# Patient Record
Sex: FEMALE | Race: WHITE | Employment: FULL TIME | URBAN - METROPOLITAN AREA
[De-identification: names, ages, dates, MRNs, and addresses within clinical notes are randomized per-mention and may not be internally consistent; named-entity substitution may affect disease eponyms.]

---

## 2022-06-06 ENCOUNTER — OFFICE VISIT (OUTPATIENT)
Dept: OBGYN CLINIC | Age: 27
End: 2022-06-06

## 2022-06-06 VITALS — DIASTOLIC BLOOD PRESSURE: 68 MMHG | SYSTOLIC BLOOD PRESSURE: 120 MMHG | BODY MASS INDEX: 29.62 KG/M2 | WEIGHT: 178 LBS

## 2022-06-06 DIAGNOSIS — N94.10 DYSPAREUNIA, FEMALE: ICD-10-CM

## 2022-06-06 DIAGNOSIS — T19.2XXS RETAINED TAMPON, SEQUELA: Primary | ICD-10-CM

## 2022-06-06 DIAGNOSIS — R45.86 MOOD CHANGE: ICD-10-CM

## 2022-06-06 PROCEDURE — 99214 OFFICE O/P EST MOD 30 MIN: CPT | Performed by: OBSTETRICS & GYNECOLOGY

## 2022-06-06 RX ORDER — LORAZEPAM 1 MG/1
1 TABLET ORAL EVERY 8 HOURS PRN
Qty: 25 TABLET | Refills: 1 | Status: SHIPPED | OUTPATIENT
Start: 2022-06-06 | End: 2022-09-04

## 2022-06-06 NOTE — PROGRESS NOTES
The patient is here for  problems including sp retained  Tampon     HISTORY:      Patient's last menstrual period was 05/23/2022. SEE BELOW      Current Outpatient Medications on File Prior to Visit   Medication Sig Dispense Refill    lisdexamfetamine (VYVANSE) 30 MG capsule Take 30 mg by mouth every morning.  Norgestim-Eth Estrad Triphasic 0.18/0.215/0.25 MG-35 MCG TABS TAKE 1 TABLET BY MOUTH EVERY DAY       No current facility-administered medications on file prior to visit. SEE LIST    ROS:      PHYSICAL EXAM:  Blood pressure 120/68, weight 178 lb (80.7 kg), last menstrual period 05/23/2022, not currently breastfeeding. The patient appears well, alert, oriented x 3, in no distress. Lungs are clear. Heart RRR, no murmurs. Abdomen soft without tenderness, guarding, mass or organomegaly. Pelvic: normal external genitalia, vulva, vagina, cervix, uterus and adnexa. ASSESSMENT:DIAGNOSIS DISCUSSED INCLUDING DIFFERENTIAL sp ER retained tampon 3w ago  Sp abx no cmt no dc  ocp  Dyspareunia vag cones     wants ativan 1mg for precoital     PLAN:    No orders of the defined types were placed in this encounter.     Complex high risk decision making many questions answered history reviewed precharting done required 30 minute of time discussing a vaiety of problems  goals are set  follow up planned

## 2022-06-14 DIAGNOSIS — F90.2 ATTENTION DEFICIT HYPERACTIVITY DISORDER (ADHD), COMBINED TYPE: Primary | ICD-10-CM

## 2022-07-25 ENCOUNTER — OFFICE VISIT (OUTPATIENT)
Dept: FAMILY MEDICINE CLINIC | Facility: CLINIC | Age: 27
End: 2022-07-25

## 2022-07-25 VITALS
HEIGHT: 66 IN | RESPIRATION RATE: 18 BRPM | SYSTOLIC BLOOD PRESSURE: 123 MMHG | TEMPERATURE: 97.8 F | DIASTOLIC BLOOD PRESSURE: 71 MMHG | HEART RATE: 92 BPM | BODY MASS INDEX: 29.49 KG/M2 | WEIGHT: 183.5 LBS | OXYGEN SATURATION: 97 %

## 2022-07-25 DIAGNOSIS — F90.2 ATTENTION DEFICIT HYPERACTIVITY DISORDER (ADHD), COMBINED TYPE: Primary | ICD-10-CM

## 2022-07-25 DIAGNOSIS — E66.09 CLASS 1 OBESITY DUE TO EXCESS CALORIES WITHOUT SERIOUS COMORBIDITY WITH BODY MASS INDEX (BMI) OF 30.0 TO 30.9 IN ADULT: ICD-10-CM

## 2022-07-25 PROCEDURE — 99213 OFFICE O/P EST LOW 20 MIN: CPT | Performed by: FAMILY MEDICINE

## 2022-07-25 NOTE — PROGRESS NOTES
[address] [facility] [address]  Phone: [phone] Fax [phone]  Jim Bustamante. Yo Griffith M.D.  7/25/2022        Julia Zambrano is a 32 y.o. female     HPI:  Patient is seen for Follow-up (3 month)  States that the Vyvanse 30 mg has been helpful with concentration and staying on task. She actually lost her last prescription that she had filled a couple of weeks ago. Has noticed much more difficulty staying focused recently. Does not report any significant depression. She has changed jobs this year and has not been going to the gym as much. ROS:  No chest pain, dyspnea, palpitations, tachycardia, insomnia, decreased appetite, or abdominal pain. Allergies   Allergen Reactions    Ciprofloxacin Other (See Comments)     Numbness and tingly feeling       Active Ambulatory Problems     Diagnosis Date Noted    No Active Ambulatory Problems     Resolved Ambulatory Problems     Diagnosis Date Noted    No Resolved Ambulatory Problems     No Additional Past Medical History        History reviewed. No pertinent surgical history. Social History     Tobacco Use    Smoking status: Every Day    Smokeless tobacco: Never    Tobacco comments:     Quit smoking: jewel   Vaping Use    Vaping Use: Never used   Substance Use Topics    Alcohol use: Yes     Alcohol/week: 4.0 standard drinks    Drug use: No       Physical Exam:  Blood pressure 123/71, pulse 92, temperature 97.8 °F (36.6 °C), temperature source Temporal, resp. rate 18, height 5' 5.5\" (1.664 m), weight 183 lb 8 oz (83.2 kg), last menstrual period 07/14/2022, SpO2 97 %, not currently breastfeeding. Pleasant female in no apparent distress. Affect is appropriate. HEENT grossly normal.  Lungs clear. Cardiovascular regular S1-S2 without murmurs rubs or gallops. Radial pulses 2+. Abdomen is soft, mildly obese, and nontender with positive bowel sounds. No hepatosplenomegaly. 1+ patellar DTRs.   No peripheral edema or cyanosis. Assessment and Plan:   Diagnosis Orders   1. Attention deficit hyperactivity disorder (ADHD), combined type  lisdexamfetamine (VYVANSE) 30 MG capsule    lisdexamfetamine (VYVANSE) 30 MG capsule    lisdexamfetamine (VYVANSE) 30 MG capsule      2. Class 1 obesity due to excess calories without serious comorbidity with body mass index (BMI) of 30.0 to 30.9 in adult          Information on ADHD and on low carbohydrate diet provided. Refilled her Vyvanse for the next 3 months but unfortunately she cannot get her Vyvanse 30 mg filled until Wednesday, July 27. Encouraged 150 minutes of low impact aerobic activity weekly. Also encouraged resistance training every other day with 24-48 hours of muscle glucose uptake subsequently. Reassess here in October 24 or more quickly as needed. Discharge Meds:  Current Outpatient Medications   Medication Sig Dispense Refill    [START ON 7/27/2022] lisdexamfetamine (VYVANSE) 30 MG capsule Take 1 capsule by mouth every morning for 30 days. 30 capsule 0    [START ON 8/26/2022] lisdexamfetamine (VYVANSE) 30 MG capsule Take 1 capsule by mouth every morning for 30 days. 30 capsule 0    [START ON 9/25/2022] lisdexamfetamine (VYVANSE) 30 MG capsule Take 1 capsule by mouth every morning for 30 days. 30 capsule 0    LORazepam (ATIVAN) 1 MG tablet Take 1 tablet by mouth every 8 hours as needed for Anxiety for up to 90 days. Take w food 1hr before anticipated coitus 25 tablet 1    Norgestim-Eth Estrad Triphasic 0.18/0.215/0.25 MG-35 MCG TABS TAKE 1 TABLET BY MOUTH EVERY DAY       No current facility-administered medications for this visit. I have reviewed this patient's report generated by the Prescription Monitoring Program which does not demonstrate aberrancies or inconsistencies with regard to the historical prescribing of controlled medications to this patient by other providers. Return in 13 weeks (on 10/24/2022).         Any new medications were explained today including any potential drug interactions or significant side effects. The patient's questions in regards to this were answered today. Dictated using voice recognition software.   Proofread, but unrecognized errors may exist.

## 2022-10-27 ENCOUNTER — OFFICE VISIT (OUTPATIENT)
Dept: FAMILY MEDICINE CLINIC | Facility: CLINIC | Age: 27
End: 2022-10-27

## 2022-10-27 VITALS
SYSTOLIC BLOOD PRESSURE: 118 MMHG | OXYGEN SATURATION: 95 % | TEMPERATURE: 97.7 F | DIASTOLIC BLOOD PRESSURE: 83 MMHG | HEART RATE: 66 BPM | WEIGHT: 166.2 LBS | HEIGHT: 66 IN | BODY MASS INDEX: 26.71 KG/M2 | RESPIRATION RATE: 18 BRPM

## 2022-10-27 DIAGNOSIS — F90.2 ATTENTION DEFICIT HYPERACTIVITY DISORDER (ADHD), COMBINED TYPE: Primary | ICD-10-CM

## 2022-10-27 PROCEDURE — 99213 OFFICE O/P EST LOW 20 MIN: CPT | Performed by: FAMILY MEDICINE

## 2022-10-27 RX ORDER — DEXTROAMPHETAMINE SACCHARATE, AMPHETAMINE ASPARTATE, DEXTROAMPHETAMINE SULFATE AND AMPHETAMINE SULFATE 2.5; 2.5; 2.5; 2.5 MG/1; MG/1; MG/1; MG/1
10 TABLET ORAL 2 TIMES DAILY
Qty: 60 TABLET | Refills: 0 | Status: SHIPPED | OUTPATIENT
Start: 2022-11-05 | End: 2022-12-05

## 2022-10-27 NOTE — LETTER
CONTROLLED SUBSTANCE MEDICATION AGREEMENT     Patient Name: Franklin Tillman  Patient YOB: 1995   I understand, that controlled substance medications may be used to help better manage my symptoms and to improve my ability to function at home, work and in social settings. However, I also understand that these medications do have risks, which have been discussed with me, including possible development of physical or psychological dependence. I understand that successful treatment requires mutual trust and honesty between me and my provider. I understand and agree that following this Medication Agreement is necessary in continuing my provider-patient relationship and the success of my treatment plan. Explanation from my Provider: Benefits and Goals of Controlled Substance Medications: There are two potential goals for your treatment: (1) decreased pain and suffering (2) improved daily life functions. There are many possible treatments for your chronic condition(s). Alternatives such as physical therapy, yoga, massage, home daily exercise, meditation, relaxation techniques, injections, chiropractic manipulations, surgery, cognitive therapy, hypnosis and many medications that are not habit-forming may be used. Use of controlled substance medications may be helpful, but they are unlikely to resolve all symptoms or restore all function. Explanation from my Provider: Risks of Controlled Substance Medications:  Opioid pain medications: These medications can lead to problems such as addiction/dependence, sedation, lightheadedness/dizziness, memory issues, falls, constipation, nausea, or vomiting. They may also impair the ability to drive or operate machinery. Additionally, these medications may lower testosterone levels, leading to loss of bone strength, stamina and sex drive.   They may cause problems with breathing, sleep apnea and reduced coughing, which is especially dangerous for patients with medications are combined with other stimulants, such as caffeine pills or energy drinks, certain weight loss supplements and oral decongestants. Dependence withdrawal symptoms may include depressed mood, loss of interest, suicidal thoughts, anxiety, fatigue, appetite changes and agitation. Testosterone replacement therapy:  Potential side effects include increased risk of stroke and heart attack, blood clots, increased blood pressure, increased cholesterol, enlarged prostate, sleep apnea, irritability/aggression and other mood disorders, and decreased fertility. I agree and understand that I and my prescriber have the following rights and responsibilities regarding my treatment plan:     1. MY RIGHTS:  To be informed of my treatment and medication plan. To be an active participant in my health and wellbeing. 2. MY RESPONSIBILITY AND UNDERSTANDING FOR USE OF MEDICATIONS   I will take medications at the dose and frequency as directed. For my safety, I will not increase or change how I take my medications without the recommendation of my healthcare provider.  I will actively participate in any program recommended by my provider which may improve function, including social, physical, psychological programs.  I will not take my medications with alcohol or other drugs not prescribed to me. I understand that drinking alcohol with my medications increases the chances of side effects, including reduced breathing rate and could lead to personal injury when operating machinery.  I understand that if I have a history of substance use disorders, including alcohol or other illicit drugs, that I may be at increased risk of addiction to my medications.  I agree to notify my provider immediately if I should become pregnant so that my treatment plan can be adjusted.    I agree and understand that I shall only receive controlled substance medications from the prescriber that signed this agreement unless there is [email or web address]    5. MY RESPONSIBILITY WITH ILLEGAL DRUGS    I will not use illegal or street drugs or another person's prescription medications not prescribed to me.  If there are identified addiction type symptoms, then referral to a program may be provided by my provider and I agree to follow through with this recommendation. 6. MY RESPONSIBILITY FOR COOPERATION WITH INVESTIGATIONS   I understand that my provider will comply with any applicable law and may discuss my use and/or possible misuse/abuse of controlled substances and alcohol, as appropriate, with any health care provider involved in my care, pharmacist, or legal authority.  I authorize my provider and pharmacy to cooperate fully with law enforcement agencies (as permitted by law) in the investigation of any possible misuse, sale, or other diversion of my controlled substances.  I agree to waive any applicable privilege or right of privacy or confidentiality with respect to these authorizations. 7. PROVIDERS RIGHT TO MONITOR FOR SAFETY: PRESCRIPTION MONITORING / DRUG TESTING   I consent to drug/toxicology screening and will submit to a drug screen upon my providers request to assure I am only taking the prescribed drugs for my safety monitoring. I understand that a drug screen is a laboratory test in which a sample of my urine, blood or saliva is checked to see what drugs I have been taking. This may entail an observed urine specimen, which means that a nurse or other health care provider may watch me provide urine, and I will cooperate if I am asked to provide an observed specimen.  I understand that my provider will check a copy of my State Prescription Monitoring Program () Report in order to safely prescribe medications.  Pill Counts: I consent to pill counts when requested.   I may be asked to bring all my prescribed controlled substance medications, in their original bottles, to all of my scheduled appointments. In addition, my provider may ask me to come to the practice at any time for a random pill count. 8. TERMINATION OF THIS AGREEMENT  For my safety, my prescriber has the right to stop prescribing controlled substance medications and may end this agreement. Initials:_______   Conditions that may result in termination of this agreement:  a. I do not show any improvement in pain, or my activity has not improved. b. I develop rapid tolerance or loss of improvement, as described in my treatment plan.  c. I develop significant side effects from the medication. d. My behavior is not consistent with the responsibilities outlined above, thereby causing safety concerns to continue prescribing controlled substance medications. e. I fail to follow the terms of this agreement. f. Other:____________________________       UNDERSTANDING THIS MEDICATION AGREEMENT:    I have read the above and have had all my questions answered. For chronic disease management, I know that my symptoms can be managed with many types of treatments. A chronic medication trial may be part of my treatment, but I must be an active participant in my care. Medication therapy is only one part of my symptom management plan. In some cases, there may be limited scientific evidence to support the chronic use of certain medications to improve symptoms and daily function. Furthermore, in certain circumstances, there may be scientific information that suggests that the use of chronic controlled substances may worsen my symptoms and increase my risk of unintentional death directly related to this medication therapy. I know that if my provider feels my risk from controlled medications is greater than my benefit, I will have my controlled substance medication(s) compassionately lowered or removed altogether.      I further agree to allow this office to contact my HIPAA contact if there are concerns about my safety and use of the controlled medications. I have agreed to use the prescribed controlled substance medications to me as instructed by my provider and as stated in this Medication Agreement. My initial on each page and my signature below shows that I have read each page and I have had the opportunity to ask questions with answers provided by my provider.     Patient Name (Printed): _____________________________________  Patient Signature:  ______________________   Date: _____________    Prescriber Name (Printed): ___________________________________  Prescriber Signature: _____________________  Date: _____________

## 2022-10-27 NOTE — PROGRESS NOTES
[address] [facility] [address]  Phone: [phone] Fax [phone]  Efrain Baker M.D.  10/27/2022        Latoya Swift is a 32 y.o. female     HPI:  Patient is seen for Follow-up (13 weeks, also having a lot of headaches lately )  States that the Vyvanse 30 mg has worked fairly well. However she reports paying out-of-pocket for this and it being greater than $200 monthly. Would like to try something less expensive. ROS:  No chest pain, dyspnea, palpitations, tachycardia, insomnia, decreased appetite, or abdominal pain. Allergies   Allergen Reactions    Ciprofloxacin Other (See Comments)     Numbness and tingly feeling       Active Ambulatory Problems     Diagnosis Date Noted    No Active Ambulatory Problems     Resolved Ambulatory Problems     Diagnosis Date Noted    No Resolved Ambulatory Problems     No Additional Past Medical History        History reviewed. No pertinent surgical history. Social History     Tobacco Use    Smoking status: Every Day    Smokeless tobacco: Never    Tobacco comments:     Quit smoking: jewel   Substance Use Topics    Alcohol use: Yes     Alcohol/week: 4.0 standard drinks    Drug use: No       Physical Exam:  Blood pressure 118/83, pulse 66, temperature 97.7 °F (36.5 °C), temperature source Temporal, resp. rate 18, height 5' 5.5\" (1.664 m), weight 166 lb 3.2 oz (75.4 kg), last menstrual period 09/05/2022, SpO2 95 %, not currently breastfeeding. Pleasant female in no apparent distress. Affect is mildly anxious. HEENT grossly normal.  Lungs clear. Cardiovascular regular S1-S2 without murmurs rubs or gallops. Radial pulses 2+. Abdomen is soft and nontender. Neuro grossly intact. No peripheral edema or cyanosis. Assessment and Plan:   Diagnosis Orders   1.  Attention deficit hyperactivity disorder (ADHD), combined type  amphetamine-dextroamphetamine (ADDERALL, 10MG,) 10 MG tablet        Information on ADHD provided. Also information on smoking/vaping cessation provided. Change from Vyvanse 30 mg to Adderall 10 mg twice daily with follow-up virtual visit here on December 2. I do see at this time where she reported to my medical assistant that she has been having more frequent headaches. Need to address this at follow-up. Substance abuse policy signed today. Discharge Meds:  Current Outpatient Medications   Medication Sig Dispense Refill    [START ON 11/5/2022] amphetamine-dextroamphetamine (ADDERALL, 10MG,) 10 MG tablet Take 1 tablet by mouth 2 times daily for 30 days. 60 tablet 0    lisdexamfetamine (VYVANSE) 30 MG capsule Take 1 capsule by mouth every morning for 30 days. 30 capsule 0    lisdexamfetamine (VYVANSE) 30 MG capsule Take 1 capsule by mouth every morning for 30 days. 30 capsule 0    lisdexamfetamine (VYVANSE) 30 MG capsule Take 1 capsule by mouth every morning for 30 days. 30 capsule 0    Norgestim-Eth Estrad Triphasic 0.18/0.215/0.25 MG-35 MCG TABS TAKE 1 TABLET BY MOUTH EVERY DAY (Patient not taking: Reported on 10/27/2022)       No current facility-administered medications for this visit. I have reviewed this patient's report generated by the Prescription Monitoring Program which does not demonstrate aberrancies or inconsistencies with regard to the historical prescribing of controlled medications to this patient by other providers. Return in 5 weeks (on 12/2/2022) for follow up at 12:30. Any new medications were explained today including any potential drug interactions or significant side effects. The patient's questions in regards to this were answered today. Dictated using voice recognition software.   Proofread, but unrecognized errors may exist.

## 2022-11-29 ENCOUNTER — OFFICE VISIT (OUTPATIENT)
Dept: OBGYN CLINIC | Age: 27
End: 2022-11-29

## 2022-11-29 VITALS — BODY MASS INDEX: 26.84 KG/M2 | WEIGHT: 167 LBS | HEIGHT: 66 IN

## 2022-11-29 DIAGNOSIS — Z01.419 WELL WOMAN EXAM WITH ROUTINE GYNECOLOGICAL EXAM: Primary | ICD-10-CM

## 2022-11-29 DIAGNOSIS — Z12.4 SCREENING FOR CERVICAL CANCER: ICD-10-CM

## 2022-11-29 PROCEDURE — 99395 PREV VISIT EST AGE 18-39: CPT | Performed by: OBSTETRICS & GYNECOLOGY

## 2022-11-29 NOTE — PROGRESS NOTES
Alison Edouard  is a 32 y.o. Digna Aloe  who is here for an annual exam.      History  History reviewed. No pertinent past medical history. ON FILE  No past surgical history on file. PRESENT IN FILE  Current Outpatient Medications on File Prior to Visit   Medication Sig Dispense Refill    amphetamine-dextroamphetamine (ADDERALL, 10MG,) 10 MG tablet Take 1 tablet by mouth 2 times daily for 30 days. 60 tablet 0    lisdexamfetamine (VYVANSE) 30 MG capsule Take 1 capsule by mouth every morning for 30 days. 30 capsule 0     No current facility-administered medications on file prior to visit. SEE UPDATED LIST  Allergies   Allergen Reactions    Ciprofloxacin Other (See Comments)     Numbness and tingly feeling   SEE LIST  Social History     Tobacco Use    Smoking status: Every Day    Smokeless tobacco: Never    Tobacco comments:     Quit smoking: jewel   Substance Use Topics    Alcohol use: Yes     Alcohol/week: 4.0 standard drinks      Family History   Problem Relation Age of Onset    Ovarian Cancer Neg Hx     Colon Cancer Neg Hx     No Known Problems Sister     Hypertension Mother     High Cholesterol Father     Breast Cancer Paternal Aunt      OBGYN History:             Physical Exam  Height 5' 5.5\" (1.664 m), weight 167 lb (75.8 kg), last menstrual period 11/19/2022, not currently breastfeeding. Body mass index is 27.37 kg/m². Lab Results   Component Value Date/Time    HGB 14.1 02/22/2022 11:26 AM      @LASTPROCAMB([id number])@  No results found for: HCGUQC, PREGU, HCGQR, THCGA1    HEENT unremarkable. Sclera non-icteric. Neck is supple without thyromegaly or nodes. Chest clear to auscultation. Heart regular rate and rhythm with no murmur, Breast exam reveals no masses or nipple discharge. No axillary notes are palpable. Abdomen is benign. BUS is normal.  Cervix IF  present. Pap smeaR performed. PRN  Bimanual exam reveals no masses.     Assessment  32 y.o. Digna Sykes  for annual exam.  Encounter Diagnoses Name Primary? Well woman exam with routine gynecological exam Yes    Screening for cervical cancer        Plan  Orders Placed This Encounter   Procedures    PAP LB, Reflex HPV ASCUS     Standing Status:   Future     Standing Expiration Date:   11/29/2023     Order Specific Question:   Pap Source? (Required)     Answer:   cervical     Order Specific Question:   Pap Source? (Required)     Answer:   endocervical     Order Specific Question:   Pap collection method? (Required     Answer:   brush     Order Specific Question:   Pap collection method? (Required     Answer:   spatula     Order Specific Question:   Menstrual Status ? Answer:   Having periods [5]     Order Specific Question:   Date of LMP? (Required)     Answer:   11/19/2022     Order Specific Question:   Previous Treatment? (Required)     Answer:   NONE   Evra  decl std ch -ucg offered hemophillia bloodwork   \"DEXA ordered\",AT AGE 63          \"Screening olonoscopy ordered\",IF >44YO  DUE         \"Recommend Calcium/MVI\",IF >35YRS  \"Recommend Gardisil immunization\"IF AGE 9-45     }CONTRACEPTION DISCUSSED      STD CHECK OFFERED IF <30YO  ,MAMMOGRAM SCHEDULED IF >41YO          MOOD DISCUSSED             NOT SUICIDAL  HEALTH MEASURE DISCUSSED INCLUDING TEETH/EYE [facility]  APPTS                    DIET/EXERCISE /SLEEP    DISCUSSED                SMOKING DISCUSSED PRN      BLADDER CONTROL Calli Ramos  is a 32 y.o. Crescencio Martini  who is here for an annual exam.      History  History reviewed. No pertinent past medical history. ON FILE  No past surgical history on file. PRESENT IN FILE  Current Outpatient Medications on File Prior to Visit   Medication Sig Dispense Refill    amphetamine-dextroamphetamine (ADDERALL, 10MG,) 10 MG tablet Take 1 tablet by mouth 2 times daily for 30 days. 60 tablet 0    lisdexamfetamine (VYVANSE) 30 MG capsule Take 1 capsule by mouth every morning for 30 days.  30 capsule 0     No current facility-administered medications on file prior to visit. SEE UPDATED LIST  Allergies   Allergen Reactions    Ciprofloxacin Other (See Comments)     Numbness and tingly feeling   SEE LIST  Social History     Tobacco Use    Smoking status: Every Day    Smokeless tobacco: Never    Tobacco comments:     Quit smoking: jewel   Substance Use Topics    Alcohol use: Yes     Alcohol/week: 4.0 standard drinks      Family History   Problem Relation Age of Onset    Ovarian Cancer Neg Hx     Colon Cancer Neg Hx     No Known Problems Sister     Hypertension Mother     High Cholesterol Father     Breast Cancer Paternal Aunt      OBGYN History:             Physical Exam  Height 5' 5.5\" (1.664 m), weight 167 lb (75.8 kg), last menstrual period 11/19/2022, not currently breastfeeding. Body mass index is 27.37 kg/m². Lab Results   Component Value Date/Time    HGB 14.1 02/22/2022 11:26 AM      @LASTPROCAMB([id number])@  No results found for: HCGUQC, PREGU, HCGQR, THCGA1    HEENT unremarkable. Sclera non-icteric. Neck is supple without thyromegaly or nodes. Chest clear to auscultation. Heart regular rate and rhythm with no murmur, Breast exam reveals no masses or nipple discharge. No axillary notes are palpable. Abdomen is benign. BUS is normal.  Cervix IF  present. Pap smeaR performed. PRN  Bimanual exam reveals no masses. Assessment  32 [facility]. [facility]  for [facility].  Encounter Diagnoses   Name Primary? Well woman exam with routine gynecological exam Yes    Screening for cervical cancer        Plan  Orders Placed This Encounter   Procedures    PAP LB, Reflex HPV ASCUS     Standing Status:   Future     Standing Expiration Date:   11/29/2023     Order Specific Question:   Pap Source? (Required)     Answer:   cervical     Order Specific Question:   Pap Source? (Required)     Answer:   endocervical     Order Specific Question:   Pap collection method?           (Required Answer:   brush     Order Specific Question:   Pap collection method? (Required     Answer:   spatula     Order Specific Question:   Menstrual Status ? Answer:   Having periods [5]     Order Specific Question:   Date of LMP? (Required)     Answer:   11/19/2022     Order Specific Question:   Previous Treatment?           (Required)     Answer:   NONE     \"DEXA ordered\",AT AGE 61          \"Screening olonoscopy ordered\",IF >46YO  DUE         \"Recommend Calcium/MVI\",IF >35YRS  \"Recommend Gardisil immunization\"IF AGE 9-45     }CONTRACEPTION DISCUSSED      STD CHECK OFFERED IF <30YO  ,MAMMOGRAM SCHEDULED IF >41YO          MOOD DISCUSSED             NOT SUICIDAL  HEALTH MEASURE DISCUSSED INCLUDING TEETH/EYE [employer]  APPTS                    DIET/EXERCISE /SLEEP    DISCUSSED                SMOKING DISCUSSED PRN      BLADDER CONTROL DISCUSSED

## 2022-12-01 DIAGNOSIS — F90.2 ATTENTION DEFICIT HYPERACTIVITY DISORDER (ADHD), COMBINED TYPE: ICD-10-CM

## 2022-12-01 NOTE — TELEPHONE ENCOUNTER
Pt appt for 12/2/22 was rescheduled due to Provider out. Needing refills on her Adderall. Last OV 10/27/22.  Next OV 1/31/23

## 2022-12-02 LAB
CYTOLOGIST CVX/VAG CYTO: NORMAL
CYTOLOGY CVX/VAG DOC THIN PREP: NORMAL
HPV REFLEX: NORMAL
Lab: NORMAL
PATH REPORT.FINAL DX SPEC: NORMAL
STAT OF ADQ CVX/VAG CYTO-IMP: NORMAL

## 2022-12-02 RX ORDER — DEXTROAMPHETAMINE SACCHARATE, AMPHETAMINE ASPARTATE, DEXTROAMPHETAMINE SULFATE AND AMPHETAMINE SULFATE 2.5; 2.5; 2.5; 2.5 MG/1; MG/1; MG/1; MG/1
10 TABLET ORAL 2 TIMES DAILY
Qty: 60 TABLET | Refills: 0 | Status: SHIPPED | OUTPATIENT
Start: 2022-12-04 | End: 2023-01-03

## 2022-12-03 NOTE — TELEPHONE ENCOUNTER
Sent in prescription for:     Requested Prescriptions     Signed Prescriptions Disp Refills    amphetamine-dextroamphetamine (ADDERALL, 10MG,) 10 MG tablet 60 tablet 0     Sig: Take 1 tablet by mouth 2 times daily for 30 days.      Authorizing Provider: Mat Guadalupe

## 2023-01-03 DIAGNOSIS — F90.2 ATTENTION DEFICIT HYPERACTIVITY DISORDER (ADHD), COMBINED TYPE: ICD-10-CM

## 2023-01-03 RX ORDER — DEXTROAMPHETAMINE SACCHARATE, AMPHETAMINE ASPARTATE, DEXTROAMPHETAMINE SULFATE AND AMPHETAMINE SULFATE 2.5; 2.5; 2.5; 2.5 MG/1; MG/1; MG/1; MG/1
10 TABLET ORAL 2 TIMES DAILY
Qty: 60 TABLET | Refills: 0 | Status: SHIPPED | OUTPATIENT
Start: 2023-01-05 | End: 2023-02-04

## 2023-01-03 NOTE — TELEPHONE ENCOUNTER
Medication Refill Request      Name of Medication : Adderall        Strength of Medication: 10 mg       Directions: 2 times daily       30 day or 90 day supply: 30 day supply      [facility]    Additional Information For Provider:

## 2023-01-31 ENCOUNTER — TELEMEDICINE (OUTPATIENT)
Dept: FAMILY MEDICINE CLINIC | Facility: CLINIC | Age: 28
End: 2023-01-31

## 2023-01-31 DIAGNOSIS — F90.2 ATTENTION DEFICIT HYPERACTIVITY DISORDER (ADHD), COMBINED TYPE: Primary | ICD-10-CM

## 2023-01-31 PROCEDURE — 99213 OFFICE O/P EST LOW 20 MIN: CPT | Performed by: FAMILY MEDICINE

## 2023-01-31 RX ORDER — DEXTROAMPHETAMINE SACCHARATE, AMPHETAMINE ASPARTATE, DEXTROAMPHETAMINE SULFATE AND AMPHETAMINE SULFATE 2.5; 2.5; 2.5; 2.5 MG/1; MG/1; MG/1; MG/1
10 TABLET ORAL 2 TIMES DAILY
Qty: 60 TABLET | Refills: 0 | Status: SHIPPED | OUTPATIENT
Start: 2023-03-10 | End: 2023-04-09

## 2023-01-31 RX ORDER — DEXTROAMPHETAMINE SACCHARATE, AMPHETAMINE ASPARTATE, DEXTROAMPHETAMINE SULFATE AND AMPHETAMINE SULFATE 2.5; 2.5; 2.5; 2.5 MG/1; MG/1; MG/1; MG/1
10 TABLET ORAL 2 TIMES DAILY
Qty: 60 TABLET | Refills: 0 | Status: SHIPPED | OUTPATIENT
Start: 2023-02-08 | End: 2023-03-10

## 2023-01-31 RX ORDER — DEXTROAMPHETAMINE SACCHARATE, AMPHETAMINE ASPARTATE, DEXTROAMPHETAMINE SULFATE AND AMPHETAMINE SULFATE 2.5; 2.5; 2.5; 2.5 MG/1; MG/1; MG/1; MG/1
10 TABLET ORAL 2 TIMES DAILY
Qty: 60 TABLET | Refills: 0 | Status: SHIPPED | OUTPATIENT
Start: 2023-04-09 | End: 2023-05-09

## 2023-01-31 NOTE — PROGRESS NOTES
[address] [facility] [address]  Phone: [phone] Fax [phone]  Paul Gilmore M.D.  1/31/2023        Pebbles Agosto is a 32 y.o. female     I was in the office while conducting this encounter    HPI:  Patient is seen for Follow-up (5 weeks)  Patient changed her Vyvanse to Adderall 10 mg twice daily secondary to expense. Difference monthly was $300 versus $30. She has noticed that if she exercises briskly such as running after taking the medication, her heart rate will sometimes reach 200. This is higher than what she had experienced prior. States her current resting heart rate is in the 80s mostly. Has started exercising prior to taking the medication in the morning and this has seemed to be helpful. Medication continues to help with concentration and staying on task as she is working and going to school. Has not noticed any side effects other than above. ROS:  No chest pain, dyspnea, palpitations, insomnia, decreased appetite, or abdominal pain. Allergies   Allergen Reactions    Ciprofloxacin Other (See Comments)     Numbness and tingly feeling       Active Ambulatory Problems     Diagnosis Date Noted    No Active Ambulatory Problems     Resolved Ambulatory Problems     Diagnosis Date Noted    No Resolved Ambulatory Problems     No Additional Past Medical History       Social History     Tobacco Use    Smoking status: Every Day    Smokeless tobacco: Never    Tobacco comments:     Quit smoking: jewel   Vaping Use    Vaping Use: Never used   Substance Use Topics    Alcohol use: Yes     Alcohol/week: 4.0 standard drinks    Drug use: No       Physical Exam:  not currently breastfeeding. - taken by patient/caregiver at home as was practical.     Pleasant female in no apparent distress. Appears well-developed and well-nourished. Affect is mildly anxious. Able to follow instruction.   HEENT grossly appears normal without any noted facial asymmetry. EOMI. Sclera appear normal.  Oral mucosa appears moist and intact. Respiratory effort appears normal without visualized signs of difficulty breathing/respiratory distress. Assessment and Plan:     Diagnosis Orders   1. Attention deficit hyperactivity disorder (ADHD), combined type  amphetamine-dextroamphetamine (ADDERALL, 10MG,) 10 MG tablet    amphetamine-dextroamphetamine (ADDERALL, 10MG,) 10 MG tablet    amphetamine-dextroamphetamine (ADDERALL, 10MG,) 10 MG tablet        Information on ADHD and stimulants provided. Refilled her Adderall 10 mg twice daily for the next 3 months. If her resting heart rate increases significantly prior to follow-up, she should let us know. Also if her exercise heart rate remains significantly elevated compared to previous despite exercising prior to taking her medication in the morning, she should let us know prior to follow-up. Plan reassessment here May 5 fasting or more quickly as needed. Discharge Meds:  Current Outpatient Medications   Medication Sig Dispense Refill    [START ON 2/8/2023] amphetamine-dextroamphetamine (ADDERALL, 10MG,) 10 MG tablet Take 1 tablet by mouth 2 times daily for 30 days. Max Daily Amount: 20 mg 60 tablet 0    [START ON 3/10/2023] amphetamine-dextroamphetamine (ADDERALL, 10MG,) 10 MG tablet Take 1 tablet by mouth 2 times daily for 30 days. Max Daily Amount: 20 mg 60 tablet 0    [START ON 4/9/2023] amphetamine-dextroamphetamine (ADDERALL, 10MG,) 10 MG tablet Take 1 tablet by mouth 2 times daily for 30 days. Max Daily Amount: 20 mg 60 tablet 0    norelgestromin-ethinyl estradiol Linard Lowing) 150-35 MCG/24HR Place 1 patch onto the skin once a week Use backup protection on new start 3 patch 3     No current facility-administered medications for this visit.      I have reviewed this patient's report generated by the Prescription Monitoring Program which does not demonstrate aberrancies or inconsistencies with regard to the historical prescribing of controlled medications to this patient by other providers.      No follow-up provider specified.      Any new medications were explained today including any potential drug interactions or significant side effects.  The patient's questions in regards to this were answered today.    Dictated using voice recognition software.  Proofread, but unrecognized errors may exist.      Consent:  This patient and/or their healthcare decision maker is aware that this patient-initiated Telehealth encounter is a billable service, with coverage as determined by their insurance carrier. Patient is aware that they may receive a bill and has provided verbal consent to proceed: Yes      This virtual visit was conducted: via [employer].  Pursuant to the emergency declaration under the Holliday Act and the National Emergencies Act, 1135 waiver authority and the Coronavirus Preparedness and Response Supplemental Appropriations Act, this Virtual  Visit was conducted to reduce the patient's risk of exposure to COVID-19 and provide continuity of care for an established patient.  Services were provided through a video synchronous discussion virtually to substitute for in-person clinic visit.  Due to this being a TeleHealth evaluation, many elements of the physical examination are unable to be assessed.       Coding Help - Use CPT Codes 88682-60396, [id number] for Established and New Patients respectively, either employing EM elements or Time rules. Other codes (example consult codes) may also apply.    Time-based coding - will disappear if no selections made : I spent at least 10 minutes with this established patient, and >50% of the time was spent counseling and/or coordinating care regarding ADHD    Yuval Carney MD  01/31/23

## 2023-02-13 ENCOUNTER — TELEPHONE (OUTPATIENT)
Dept: FAMILY MEDICINE CLINIC | Facility: CLINIC | Age: 28
End: 2023-02-13

## 2023-02-13 DIAGNOSIS — F90.2 ATTENTION DEFICIT HYPERACTIVITY DISORDER (ADHD), COMBINED TYPE: ICD-10-CM

## 2023-02-13 RX ORDER — DEXTROAMPHETAMINE SACCHARATE, AMPHETAMINE ASPARTATE, DEXTROAMPHETAMINE SULFATE AND AMPHETAMINE SULFATE 5; 5; 5; 5 MG/1; MG/1; MG/1; MG/1
10 TABLET ORAL 2 TIMES DAILY
Qty: 30 TABLET | Refills: 0 | Status: SHIPPED | OUTPATIENT
Start: 2023-02-13 | End: 2023-03-15

## 2023-02-13 RX ORDER — DEXTROAMPHETAMINE SACCHARATE, AMPHETAMINE ASPARTATE, DEXTROAMPHETAMINE SULFATE AND AMPHETAMINE SULFATE 5; 5; 5; 5 MG/1; MG/1; MG/1; MG/1
10 TABLET ORAL 2 TIMES DAILY
Qty: 30 TABLET | Refills: 0 | Status: SHIPPED | OUTPATIENT
Start: 2023-03-15 | End: 2023-04-14

## 2023-02-13 RX ORDER — DEXTROAMPHETAMINE SACCHARATE, AMPHETAMINE ASPARTATE, DEXTROAMPHETAMINE SULFATE AND AMPHETAMINE SULFATE 5; 5; 5; 5 MG/1; MG/1; MG/1; MG/1
10 TABLET ORAL 2 TIMES DAILY
Qty: 30 TABLET | Refills: 0 | Status: SHIPPED | OUTPATIENT
Start: 2023-04-14 | End: 2023-05-14

## 2023-02-13 NOTE — TELEPHONE ENCOUNTER
[facility]. States that [facility] [place] has the 20 mg. Asking if we can send this in and she can cut in half.

## 2023-05-17 ENCOUNTER — TELEPHONE (OUTPATIENT)
Dept: FAMILY MEDICINE CLINIC | Facility: CLINIC | Age: 28
End: 2023-05-17

## 2023-05-23 ENCOUNTER — OFFICE VISIT (OUTPATIENT)
Dept: FAMILY MEDICINE CLINIC | Facility: CLINIC | Age: 28
End: 2023-05-23

## 2023-05-23 VITALS
TEMPERATURE: 98.1 F | WEIGHT: 165.7 LBS | DIASTOLIC BLOOD PRESSURE: 67 MMHG | RESPIRATION RATE: 18 BRPM | HEIGHT: 66 IN | HEART RATE: 72 BPM | SYSTOLIC BLOOD PRESSURE: 120 MMHG | OXYGEN SATURATION: 99 % | BODY MASS INDEX: 26.63 KG/M2

## 2023-05-23 DIAGNOSIS — F90.2 ATTENTION DEFICIT HYPERACTIVITY DISORDER (ADHD), COMBINED TYPE: Primary | ICD-10-CM

## 2023-05-23 DIAGNOSIS — T14.8XXA BRUISING: ICD-10-CM

## 2023-05-23 DIAGNOSIS — E78.1 HIGH BLOOD TRIGLYCERIDES: ICD-10-CM

## 2023-05-23 DIAGNOSIS — F17.290 OTHER TOBACCO PRODUCT NICOTINE DEPENDENCE, UNCOMPLICATED: ICD-10-CM

## 2023-05-23 LAB
BASOPHILS # BLD: 0.1 K/UL (ref 0–0.2)
BASOPHILS NFR BLD: 1 % (ref 0–2)
DIFFERENTIAL METHOD BLD: NORMAL
EOSINOPHIL # BLD: 0.2 K/UL (ref 0–0.8)
EOSINOPHIL NFR BLD: 3 % (ref 0.5–7.8)
ERYTHROCYTE [DISTWIDTH] IN BLOOD BY AUTOMATED COUNT: 12.8 % (ref 11.9–14.6)
HCT VFR BLD AUTO: 40.7 % (ref 35.8–46.3)
HGB BLD-MCNC: 13.3 G/DL (ref 11.7–15.4)
IMM GRANULOCYTES # BLD AUTO: 0 K/UL (ref 0–0.5)
IMM GRANULOCYTES NFR BLD AUTO: 0 % (ref 0–5)
LYMPHOCYTES # BLD: 2.3 K/UL (ref 0.5–4.6)
LYMPHOCYTES NFR BLD: 32 % (ref 13–44)
MCH RBC QN AUTO: 31.1 PG (ref 26.1–32.9)
MCHC RBC AUTO-ENTMCNC: 32.7 G/DL (ref 31.4–35)
MCV RBC AUTO: 95.3 FL (ref 82–102)
MONOCYTES # BLD: 0.7 K/UL (ref 0.1–1.3)
MONOCYTES NFR BLD: 10 % (ref 4–12)
NEUTS SEG # BLD: 4 K/UL (ref 1.7–8.2)
NEUTS SEG NFR BLD: 54 % (ref 43–78)
NRBC # BLD: 0 K/UL (ref 0–0.2)
PLATELET # BLD AUTO: 315 K/UL (ref 150–450)
PMV BLD AUTO: 10.5 FL (ref 9.4–12.3)
RBC # BLD AUTO: 4.27 M/UL (ref 4.05–5.2)
WBC # BLD AUTO: 7.3 K/UL (ref 4.3–11.1)

## 2023-05-23 PROCEDURE — 99214 OFFICE O/P EST MOD 30 MIN: CPT | Performed by: FAMILY MEDICINE

## 2023-05-23 RX ORDER — DEXTROAMPHETAMINE SACCHARATE, AMPHETAMINE ASPARTATE, DEXTROAMPHETAMINE SULFATE AND AMPHETAMINE SULFATE 5; 5; 5; 5 MG/1; MG/1; MG/1; MG/1
10 TABLET ORAL 2 TIMES DAILY
Qty: 30 TABLET | Refills: 0 | Status: SHIPPED | OUTPATIENT
Start: 2023-07-16 | End: 2023-08-15

## 2023-05-23 RX ORDER — DEXTROAMPHETAMINE SACCHARATE, AMPHETAMINE ASPARTATE, DEXTROAMPHETAMINE SULFATE AND AMPHETAMINE SULFATE 5; 5; 5; 5 MG/1; MG/1; MG/1; MG/1
10 TABLET ORAL 2 TIMES DAILY
Qty: 30 TABLET | Refills: 0 | Status: SHIPPED | OUTPATIENT
Start: 2023-06-16 | End: 2023-07-16

## 2023-05-23 RX ORDER — DEXTROAMPHETAMINE SACCHARATE, AMPHETAMINE ASPARTATE, DEXTROAMPHETAMINE SULFATE AND AMPHETAMINE SULFATE 5; 5; 5; 5 MG/1; MG/1; MG/1; MG/1
10 TABLET ORAL 2 TIMES DAILY
Qty: 30 TABLET | Refills: 0 | Status: SHIPPED | OUTPATIENT
Start: 2023-08-15 | End: 2023-09-14

## 2023-05-23 ASSESSMENT — PATIENT HEALTH QUESTIONNAIRE - PHQ9
SUM OF ALL RESPONSES TO PHQ QUESTIONS 1-9: 0
SUM OF ALL RESPONSES TO PHQ9 QUESTIONS 1 & 2: 0
1. LITTLE INTEREST OR PLEASURE IN DOING THINGS: 0
SUM OF ALL RESPONSES TO PHQ QUESTIONS 1-9: 0
2. FEELING DOWN, DEPRESSED OR HOPELESS: 0
SUM OF ALL RESPONSES TO PHQ QUESTIONS 1-9: 0
SUM OF ALL RESPONSES TO PHQ QUESTIONS 1-9: 0

## 2023-05-23 NOTE — PROGRESS NOTES
[address] [facility] [address]  Phone: [phone] Fax [phone]  Evelia Syed M.D.  5/23/2023        Cynthia Gutierrez is a 29 y.o. female     HPI:  Patient is seen for Follow-up (Med refill )  Patient comes in today fasting for follow-up. The Adderall continues to help with focus. Previously she had some notation for elevated heart rate with exercise but has not noticed this regularly in the recent past.    Has had some more frequent bruising. Most recently she had some right forearm bruising and inflammation that seems to be resolving but she is uncertain as to the reason for this. Does continue to vape daily. States she spoke to her dentist recently who recommended vaping cessation as she was starting to have some dental concerns. Chart review does show where patient previously had some elevated triglycerides. ROS:  No chest pain, dyspnea, palpitations, tachycardia, insomnia, decreased appetite, or abdominal pain. Allergies   Allergen Reactions    Ciprofloxacin Other (See Comments)     Numbness and tingly feeling       Active Ambulatory Problems     Diagnosis Date Noted    Body mass index (BMI) 27.0-27.9, adult 05/23/2023    Attention deficit hyperactivity disorder (ADHD), combined type 05/23/2023    High blood triglycerides 05/23/2023     Resolved Ambulatory Problems     Diagnosis Date Noted    No Resolved Ambulatory Problems     No Additional Past Medical History        History reviewed. No pertinent surgical history. Social History     Tobacco Use    Smoking status: Former     Types: Cigarettes    Smokeless tobacco: Never    Tobacco comments:     Quit smoking: jewel   Vaping Use    Vaping Use: Every day    Substances: Nicotine, Flavoring    Devices: Pre-filled pod   Substance Use Topics    Alcohol use:  Yes     Alcohol/week: 4.0 standard drinks    Drug use: No       Physical Exam:  Blood pressure 120/67, pulse 72, temperature

## 2023-05-24 LAB
ALBUMIN SERPL-MCNC: 4 G/DL (ref 3.5–5)
ALBUMIN/GLOB SERPL: 1 (ref 0.4–1.6)
ALP SERPL-CCNC: 75 U/L (ref 50–136)
ALT SERPL-CCNC: 35 U/L (ref 12–65)
ANION GAP SERPL CALC-SCNC: 7 MMOL/L (ref 2–11)
AST SERPL-CCNC: 23 U/L (ref 15–37)
BILIRUB SERPL-MCNC: 0.5 MG/DL (ref 0.2–1.1)
BUN SERPL-MCNC: 8 MG/DL (ref 6–23)
CALCIUM SERPL-MCNC: 8.9 MG/DL (ref 8.3–10.4)
CHLORIDE SERPL-SCNC: 106 MMOL/L (ref 101–110)
CHOLEST SERPL-MCNC: 187 MG/DL
CO2 SERPL-SCNC: 25 MMOL/L (ref 21–32)
CREAT SERPL-MCNC: 0.7 MG/DL (ref 0.6–1)
GLOBULIN SER CALC-MCNC: 3.9 G/DL (ref 2.8–4.5)
GLUCOSE SERPL-MCNC: 85 MG/DL (ref 65–100)
HDLC SERPL-MCNC: 91 MG/DL (ref 40–60)
HDLC SERPL: 2.1
LDLC SERPL CALC-MCNC: 80.6 MG/DL
POTASSIUM SERPL-SCNC: 4 MMOL/L (ref 3.5–5.1)
PROT SERPL-MCNC: 7.9 G/DL (ref 6.3–8.2)
SODIUM SERPL-SCNC: 138 MMOL/L (ref 133–143)
TRIGL SERPL-MCNC: 77 MG/DL (ref 35–150)
VLDLC SERPL CALC-MCNC: 15.4 MG/DL (ref 6–23)

## 2023-09-18 ENCOUNTER — TELEPHONE (OUTPATIENT)
Dept: FAMILY MEDICINE CLINIC | Facility: CLINIC | Age: 28
End: 2023-09-18

## 2023-09-18 DIAGNOSIS — F90.2 ATTENTION DEFICIT HYPERACTIVITY DISORDER (ADHD), COMBINED TYPE: ICD-10-CM

## 2023-09-18 RX ORDER — DEXTROAMPHETAMINE SACCHARATE, AMPHETAMINE ASPARTATE, DEXTROAMPHETAMINE SULFATE AND AMPHETAMINE SULFATE 5; 5; 5; 5 MG/1; MG/1; MG/1; MG/1
10 TABLET ORAL 2 TIMES DAILY
Qty: 30 TABLET | Refills: 0 | Status: SHIPPED | OUTPATIENT
Start: 2023-09-18 | End: 2023-10-18

## 2023-09-18 NOTE — TELEPHONE ENCOUNTER
Medication Refill Request      Name of Medication : Adderall       Strength of Medication: 20 mg      Directions:  Take 0.5 tablets by mouth 2 times daily for 30 days      30 day or 90 day supply: 30      Preferred Pharmacy: [facility] in [address] [place] [address],  [id number] For Provider:

## 2023-09-22 SDOH — ECONOMIC STABILITY: FOOD INSECURITY: WITHIN THE PAST 12 MONTHS, THE FOOD YOU BOUGHT JUST DIDN'T LAST AND YOU DIDN'T HAVE MONEY TO GET MORE.: NEVER TRUE

## 2023-09-22 SDOH — ECONOMIC STABILITY: INCOME INSECURITY: HOW HARD IS IT FOR YOU TO PAY FOR THE VERY BASICS LIKE FOOD, HOUSING, MEDICAL CARE, AND HEATING?: NOT VERY HARD

## 2023-09-22 SDOH — ECONOMIC STABILITY: HOUSING INSECURITY
IN THE LAST 12 MONTHS, WAS THERE A TIME WHEN YOU DID NOT HAVE A STEADY PLACE TO SLEEP OR SLEPT IN A SHELTER (INCLUDING NOW)?: NO

## 2023-09-22 SDOH — ECONOMIC STABILITY: TRANSPORTATION INSECURITY
IN THE PAST 12 MONTHS, HAS LACK OF TRANSPORTATION KEPT YOU FROM MEETINGS, WORK, OR FROM GETTING THINGS NEEDED FOR DAILY LIVING?: NO

## 2023-09-22 SDOH — ECONOMIC STABILITY: FOOD INSECURITY: WITHIN THE PAST 12 MONTHS, YOU WORRIED THAT YOUR FOOD WOULD RUN OUT BEFORE YOU GOT MONEY TO BUY MORE.: NEVER TRUE

## 2023-09-22 ASSESSMENT — PATIENT HEALTH QUESTIONNAIRE - PHQ9
1. LITTLE INTEREST OR PLEASURE IN DOING THINGS: NOT AT ALL
SUM OF ALL RESPONSES TO PHQ QUESTIONS 1-9: 0
1. LITTLE INTEREST OR PLEASURE IN DOING THINGS: 0
SUM OF ALL RESPONSES TO PHQ9 QUESTIONS 1 & 2: 0
SUM OF ALL RESPONSES TO PHQ9 QUESTIONS 1 & 2: 0
2. FEELING DOWN, DEPRESSED OR HOPELESS: NOT AT ALL
SUM OF ALL RESPONSES TO PHQ QUESTIONS 1-9: 0
2. FEELING DOWN, DEPRESSED OR HOPELESS: 0

## 2023-09-25 ENCOUNTER — OFFICE VISIT (OUTPATIENT)
Dept: FAMILY MEDICINE CLINIC | Facility: CLINIC | Age: 28
End: 2023-09-25

## 2023-09-25 VITALS
HEIGHT: 66 IN | OXYGEN SATURATION: 98 % | HEART RATE: 93 BPM | BODY MASS INDEX: 27 KG/M2 | DIASTOLIC BLOOD PRESSURE: 73 MMHG | RESPIRATION RATE: 16 BRPM | WEIGHT: 168 LBS | SYSTOLIC BLOOD PRESSURE: 120 MMHG | TEMPERATURE: 97.7 F

## 2023-09-25 DIAGNOSIS — F90.2 ATTENTION DEFICIT HYPERACTIVITY DISORDER (ADHD), COMBINED TYPE: Primary | ICD-10-CM

## 2023-09-25 PROCEDURE — 99213 OFFICE O/P EST LOW 20 MIN: CPT | Performed by: FAMILY MEDICINE

## 2023-09-25 RX ORDER — DEXTROAMPHETAMINE SACCHARATE, AMPHETAMINE ASPARTATE, DEXTROAMPHETAMINE SULFATE AND AMPHETAMINE SULFATE 5; 5; 5; 5 MG/1; MG/1; MG/1; MG/1
10 TABLET ORAL 2 TIMES DAILY
Qty: 30 TABLET | Refills: 0 | Status: SHIPPED | OUTPATIENT
Start: 2023-10-19 | End: 2023-11-18

## 2023-09-25 RX ORDER — DEXTROAMPHETAMINE SACCHARATE, AMPHETAMINE ASPARTATE, DEXTROAMPHETAMINE SULFATE AND AMPHETAMINE SULFATE 5; 5; 5; 5 MG/1; MG/1; MG/1; MG/1
10 TABLET ORAL 2 TIMES DAILY
Qty: 30 TABLET | Refills: 0 | Status: SHIPPED | OUTPATIENT
Start: 2023-11-18 | End: 2023-12-18

## 2023-09-25 RX ORDER — DEXTROAMPHETAMINE SACCHARATE, AMPHETAMINE ASPARTATE, DEXTROAMPHETAMINE SULFATE AND AMPHETAMINE SULFATE 5; 5; 5; 5 MG/1; MG/1; MG/1; MG/1
10 TABLET ORAL 2 TIMES DAILY
Qty: 30 TABLET | Refills: 0 | Status: SHIPPED | OUTPATIENT
Start: 2023-12-18 | End: 2024-01-17

## 2023-12-28 ENCOUNTER — TELEPHONE (OUTPATIENT)
Dept: FAMILY MEDICINE CLINIC | Facility: CLINIC | Age: 28
End: 2023-12-28

## 2023-12-28 DIAGNOSIS — F90.2 ATTENTION DEFICIT HYPERACTIVITY DISORDER (ADHD), COMBINED TYPE: ICD-10-CM

## 2023-12-28 RX ORDER — DEXTROAMPHETAMINE SACCHARATE, AMPHETAMINE ASPARTATE, DEXTROAMPHETAMINE SULFATE AND AMPHETAMINE SULFATE 5; 5; 5; 5 MG/1; MG/1; MG/1; MG/1
10 TABLET ORAL 2 TIMES DAILY
Qty: 30 TABLET | Refills: 0 | Status: SHIPPED | OUTPATIENT
Start: 2023-12-28 | End: 2024-01-27

## 2023-12-28 NOTE — TELEPHONE ENCOUNTER
Pt requesting refill on adderall to [facility] in [facility] on [address] - was seen Sept 2023 has f/u Jan 2024

## 2024-01-05 DIAGNOSIS — F90.2 ATTENTION DEFICIT HYPERACTIVITY DISORDER (ADHD), COMBINED TYPE: ICD-10-CM

## 2024-01-05 NOTE — TELEPHONE ENCOUNTER
Had appt on 01- cx dr olmstead out of office She will be out of meds on the 27th ADDERALL needs to be called in

## 2024-01-08 RX ORDER — DEXTROAMPHETAMINE SACCHARATE, AMPHETAMINE ASPARTATE, DEXTROAMPHETAMINE SULFATE AND AMPHETAMINE SULFATE 5; 5; 5; 5 MG/1; MG/1; MG/1; MG/1
10 TABLET ORAL 2 TIMES DAILY
Qty: 30 TABLET | Refills: 0 | OUTPATIENT
Start: 2024-02-26 | End: 2024-03-27

## 2024-01-08 RX ORDER — DEXTROAMPHETAMINE SACCHARATE, AMPHETAMINE ASPARTATE, DEXTROAMPHETAMINE SULFATE AND AMPHETAMINE SULFATE 5; 5; 5; 5 MG/1; MG/1; MG/1; MG/1
10 TABLET ORAL 2 TIMES DAILY
Qty: 30 TABLET | Refills: 0 | OUTPATIENT
Start: 2024-03-27 | End: 2024-04-26

## 2024-01-08 RX ORDER — DEXTROAMPHETAMINE SACCHARATE, AMPHETAMINE ASPARTATE, DEXTROAMPHETAMINE SULFATE AND AMPHETAMINE SULFATE 5; 5; 5; 5 MG/1; MG/1; MG/1; MG/1
10 TABLET ORAL 2 TIMES DAILY
Qty: 30 TABLET | Refills: 0 | OUTPATIENT
Start: 2024-01-27 | End: 2024-02-26

## 2024-01-26 DIAGNOSIS — F90.2 ATTENTION DEFICIT HYPERACTIVITY DISORDER (ADHD), COMBINED TYPE: ICD-10-CM

## 2024-01-26 RX ORDER — DEXTROAMPHETAMINE SACCHARATE, AMPHETAMINE ASPARTATE, DEXTROAMPHETAMINE SULFATE AND AMPHETAMINE SULFATE 5; 5; 5; 5 MG/1; MG/1; MG/1; MG/1
10 TABLET ORAL 2 TIMES DAILY
Qty: 30 TABLET | Refills: 0 | Status: SHIPPED | OUTPATIENT
Start: 2024-01-26 | End: 2024-02-25

## 2024-01-26 NOTE — TELEPHONE ENCOUNTER
Needs Appointment.  Please schedule patient for appointment.     Prescription(s) refilled  It (they) has been escribed to the pharmacy.    Requested Prescriptions     Signed Prescriptions Disp Refills    amphetamine-dextroamphetamine (ADDERALL, 20MG,) 20 MG tablet 30 tablet 0     Sig: Take 0.5 tablets by mouth 2 times daily for 30 days. Max Daily Amount: 20 mg     Authorizing Provider: FELICITY WEBSTER     No orders of the defined types were placed in this encounter.          ICD-10-CM    1. Attention deficit hyperactivity disorder (ADHD), combined type  F90.2 amphetamine-dextroamphetamine (ADDERALL, 20MG,) 20 MG tablet

## 2024-02-08 ENCOUNTER — E-VISIT (OUTPATIENT)
Dept: FAMILY MEDICINE CLINIC | Facility: CLINIC | Age: 29
End: 2024-02-08

## 2024-02-08 DIAGNOSIS — S32.10XD CLOSED FRACTURE OF SACRUM WITH ROUTINE HEALING, UNSPECIFIED PORTION OF SACRUM, SUBSEQUENT ENCOUNTER: ICD-10-CM

## 2024-02-08 DIAGNOSIS — M53.3 SACRAL BACK PAIN: Primary | ICD-10-CM

## 2024-02-08 PROCEDURE — 99421 OL DIG E/M SVC 5-10 MIN: CPT | Performed by: FAMILY MEDICINE

## 2024-02-08 NOTE — PROGRESS NOTES
Michelle Hoffman  1995 initiated an asynchronous digital communication through [employer].    HPI: per patient questionnaire     Exam: not applicable    Diagnoses and all orders for this visit:  Diagnoses and all orders for this visit:    Sacral back pain  -     MRI PELVIS WO CONTRAST; Future    Closed fracture of sacrum with routine healing, unspecified portion of sacrum, subsequent encounter  -     MRI PELVIS WO CONTRAST; Future    Obtaining sacral MRI to assess sacral fracture with persistent pain more in the left buttocks.  Would love to obtain the urgent care visit information and radiologist interpretation of the initial x-rays.    Provided information on the following:    Time: EV1 - 5-10 minutes were spent on the digital evaluation and management of this patient.      Yuval Carney MD

## 2024-02-15 DIAGNOSIS — M53.3 SACRAL BACK PAIN: ICD-10-CM

## 2024-02-15 DIAGNOSIS — S32.10XD CLOSED FRACTURE OF SACRUM WITH ROUTINE HEALING, UNSPECIFIED PORTION OF SACRUM, SUBSEQUENT ENCOUNTER: ICD-10-CM

## 2024-02-15 NOTE — RESULT ENCOUNTER NOTE
Call back radiologist read as acute mildly displaced fracture of the coccyx without significant angulation.

## 2024-02-27 ENCOUNTER — TELEPHONE (OUTPATIENT)
Dept: FAMILY MEDICINE CLINIC | Facility: CLINIC | Age: 29
End: 2024-02-27

## 2024-02-27 DIAGNOSIS — F90.2 ATTENTION DEFICIT HYPERACTIVITY DISORDER (ADHD), COMBINED TYPE: ICD-10-CM

## 2024-02-27 RX ORDER — DEXTROAMPHETAMINE SACCHARATE, AMPHETAMINE ASPARTATE, DEXTROAMPHETAMINE SULFATE AND AMPHETAMINE SULFATE 5; 5; 5; 5 MG/1; MG/1; MG/1; MG/1
10 TABLET ORAL 2 TIMES DAILY
Qty: 30 TABLET | Refills: 0 | Status: SHIPPED | OUTPATIENT
Start: 2024-02-27 | End: 2024-03-28

## 2024-02-27 NOTE — TELEPHONE ENCOUNTER
Medication Refill Request      Name of Medication : Adderall      Strength of Medication: 20 mg      Directions: Take 0.5 tablets by mouth 2 times daily      30 day or 90 day supply: 30      Preferred Pharmacy: [facility] [address] in [place]    Additional Information For Provider:

## 2024-02-27 NOTE — TELEPHONE ENCOUNTER
Pt has no future appointment scheduled was last seen in 09/2023, 30 day supply pend please advise.

## 2024-03-19 ASSESSMENT — PATIENT HEALTH QUESTIONNAIRE - PHQ9
SUM OF ALL RESPONSES TO PHQ QUESTIONS 1-9: 0
SUM OF ALL RESPONSES TO PHQ9 QUESTIONS 1 & 2: 0
SUM OF ALL RESPONSES TO PHQ QUESTIONS 1-9: 0
SUM OF ALL RESPONSES TO PHQ QUESTIONS 1-9: 0
2. FEELING DOWN, DEPRESSED OR HOPELESS: NOT AT ALL
SUM OF ALL RESPONSES TO PHQ9 QUESTIONS 1 & 2: 0
SUM OF ALL RESPONSES TO PHQ QUESTIONS 1-9: 0
1. LITTLE INTEREST OR PLEASURE IN DOING THINGS: NOT AT ALL
1. LITTLE INTEREST OR PLEASURE IN DOING THINGS: NOT AT ALL
2. FEELING DOWN, DEPRESSED OR HOPELESS: NOT AT ALL

## 2024-03-21 ENCOUNTER — TELEPHONE (OUTPATIENT)
Dept: FAMILY MEDICINE CLINIC | Facility: CLINIC | Age: 29
End: 2024-03-21

## 2024-03-21 ENCOUNTER — OFFICE VISIT (OUTPATIENT)
Dept: FAMILY MEDICINE CLINIC | Facility: CLINIC | Age: 29
End: 2024-03-21

## 2024-03-21 VITALS
RESPIRATION RATE: 16 BRPM | HEIGHT: 66 IN | OXYGEN SATURATION: 99 % | SYSTOLIC BLOOD PRESSURE: 137 MMHG | TEMPERATURE: 97.4 F | BODY MASS INDEX: 26.03 KG/M2 | WEIGHT: 162 LBS | HEART RATE: 99 BPM | DIASTOLIC BLOOD PRESSURE: 91 MMHG

## 2024-03-21 DIAGNOSIS — F90.2 ATTENTION DEFICIT HYPERACTIVITY DISORDER (ADHD), COMBINED TYPE: Primary | ICD-10-CM

## 2024-03-21 DIAGNOSIS — S32.2XXS CLOSED FRACTURE OF COCCYX, SEQUELA: Primary | ICD-10-CM

## 2024-03-21 DIAGNOSIS — F90.2 ATTENTION DEFICIT HYPERACTIVITY DISORDER (ADHD), COMBINED TYPE: ICD-10-CM

## 2024-03-21 PROCEDURE — 99213 OFFICE O/P EST LOW 20 MIN: CPT | Performed by: NURSE PRACTITIONER

## 2024-03-21 RX ORDER — DEXTROAMPHETAMINE SACCHARATE, AMPHETAMINE ASPARTATE, DEXTROAMPHETAMINE SULFATE AND AMPHETAMINE SULFATE 7.5; 7.5; 7.5; 7.5 MG/1; MG/1; MG/1; MG/1
15 TABLET ORAL 2 TIMES DAILY
Qty: 30 TABLET | Refills: 0 | Status: SHIPPED | OUTPATIENT
Start: 2024-03-28 | End: 2024-04-27

## 2024-03-21 ASSESSMENT — ENCOUNTER SYMPTOMS: RESPIRATORY NEGATIVE: 1

## 2024-03-21 NOTE — PROGRESS NOTES
137/91   Pulse (!) 106   Temp 97.4 °F (36.3 °C)   Resp 16   Ht 1.664 m (5' 5.5\")   Wt 73.5 kg (162 lb)   SpO2 99%   BMI 26.55 kg/m²       Physical Exam  Vitals and nursing note reviewed.   Constitutional:       Appearance: Normal appearance.   Cardiovascular:      Rate and Rhythm: Normal rate and regular rhythm.      Heart sounds: Normal heart sounds.   Pulmonary:      Effort: Pulmonary effort is normal.      Breath sounds: Normal breath sounds.   Musculoskeletal:      Comments: Alternating between  standing and sitting due to coccyx pain   Neurological:      Mental Status: She is alert and oriented to person, place, and time.   Psychiatric:         Attention and Perception: Attention normal.         Mood and Affect: Mood normal.         Speech: Speech normal.         Behavior: Behavior normal. Behavior is cooperative.         Thought Content: Thought content normal.         Assessment/Plan:  1. Closed fracture of coccyx, sequela    2. Attention deficit hyperactivity disorder (ADHD), combined type          Michelle was seen today for adhd and tailbone pain.    Diagnoses and all orders for this visit:    Closed fracture of coccyx, sequela  -     [facility]    Attention deficit hyperactivity disorder (ADHD), combined type       No orders of the defined types were placed in this encounter.      No follow-ups on file.  Referral to physical therapy for coccyx pain.  Note sent to PCP requesting Adderall increase.  Reviewed patients past medical history. We discussed expected course, resolution, and complications of diagnosis in detail . Discussed treatment plan and follow up. Patient to return if symptoms persist or worsen.      ARIAN Munoz - NP

## 2024-03-25 NOTE — TELEPHONE ENCOUNTER
LVM informing pt of med increase and when its due to  and to call back to schedule an appt for 3-4 weeks from now.

## 2024-03-26 ENCOUNTER — TELEPHONE (OUTPATIENT)
Dept: FAMILY MEDICINE CLINIC | Facility: CLINIC | Age: 29
End: 2024-03-26

## 2024-03-26 NOTE — TELEPHONE ENCOUNTER
Per my chart message:      For my Adderall increase follow-up, would an e-visit be okay?    Pt asking if she can do an e-visit for her adderall increase fup. Can pt do this or do we need to schedule an appt with NP or PA since PCP is full.

## 2024-04-26 DIAGNOSIS — F90.2 ATTENTION DEFICIT HYPERACTIVITY DISORDER (ADHD), COMBINED TYPE: ICD-10-CM

## 2024-04-29 RX ORDER — DEXTROAMPHETAMINE SACCHARATE, AMPHETAMINE ASPARTATE, DEXTROAMPHETAMINE SULFATE AND AMPHETAMINE SULFATE 7.5; 7.5; 7.5; 7.5 MG/1; MG/1; MG/1; MG/1
15 TABLET ORAL 2 TIMES DAILY
Qty: 30 TABLET | Refills: 0 | Status: SHIPPED | OUTPATIENT
Start: 2024-04-29 | End: 2024-05-29

## 2024-04-29 NOTE — TELEPHONE ENCOUNTER
Sent in prescription for:     Requested Prescriptions     Signed Prescriptions Disp Refills    amphetamine-dextroamphetamine (ADDERALL, 30MG,) 30 MG tablet 30 tablet 0     Sig: Take 0.5 tablets by mouth 2 times daily for 30 days. Max Daily Amount: 30 mg     Authorizing Provider: JESSICA BURNS

## 2024-05-28 ENCOUNTER — TELEPHONE (OUTPATIENT)
Dept: FAMILY MEDICINE CLINIC | Facility: CLINIC | Age: 29
End: 2024-05-28

## 2024-05-28 DIAGNOSIS — F90.2 ATTENTION DEFICIT HYPERACTIVITY DISORDER (ADHD), COMBINED TYPE: ICD-10-CM

## 2024-05-28 RX ORDER — DEXTROAMPHETAMINE SACCHARATE, AMPHETAMINE ASPARTATE, DEXTROAMPHETAMINE SULFATE AND AMPHETAMINE SULFATE 7.5; 7.5; 7.5; 7.5 MG/1; MG/1; MG/1; MG/1
15 TABLET ORAL 2 TIMES DAILY
Qty: 30 TABLET | Refills: 0 | Status: SHIPPED | OUTPATIENT
Start: 2024-06-27 | End: 2024-07-27

## 2024-05-28 RX ORDER — DEXTROAMPHETAMINE SACCHARATE, AMPHETAMINE ASPARTATE, DEXTROAMPHETAMINE SULFATE AND AMPHETAMINE SULFATE 7.5; 7.5; 7.5; 7.5 MG/1; MG/1; MG/1; MG/1
15 TABLET ORAL 2 TIMES DAILY
Qty: 30 TABLET | Refills: 0 | Status: SHIPPED | OUTPATIENT
Start: 2024-05-28 | End: 2024-06-27

## 2024-05-28 NOTE — TELEPHONE ENCOUNTER
Prescription (s) refilled  It (they) has been escribed to the pharmacy.    Requested Prescriptions     Signed Prescriptions Disp Refills    amphetamine-dextroamphetamine (ADDERALL, 30MG,) 30 MG tablet 30 tablet 0     Sig: Take 0.5 tablets by mouth 2 times daily for 30 days. Max Daily Amount: 30 mg     Authorizing Provider: FELICITY WEBSTER    amphetamine-dextroamphetamine (ADDERALL, 30MG,) 30 MG tablet 30 tablet 0     Sig: Take 0.5 tablets by mouth 2 times daily for 30 days. Max Daily Amount: 30 mg     Authorizing Provider: FELICITY WEBSTER     No orders of the defined types were placed in this encounter.          ICD-10-CM    1. Attention deficit hyperactivity disorder (ADHD), combined type  F90.2 amphetamine-dextroamphetamine (ADDERALL, 30MG,) 30 MG tablet     amphetamine-dextroamphetamine (ADDERALL, 30MG,) 30 MG tablet

## 2024-05-28 NOTE — TELEPHONE ENCOUNTER
Medication Refill Request      Name of Medication : Adderall       Strength of Medication: 30 mg      Directions: Take 0.5 tablets by mouth 2 time daily for 30 days      30 day or 90 day supply: 30      Preferred Pharmacy: [facility] [address] in [place]    Additional Information For Provider:Has an appointment on 07/15/24 with Dr Carney

## 2024-06-13 ENCOUNTER — PATIENT MESSAGE (OUTPATIENT)
Dept: FAMILY MEDICINE CLINIC | Facility: CLINIC | Age: 29
End: 2024-06-13

## 2024-06-13 DIAGNOSIS — M53.3 SACRAL BACK PAIN: ICD-10-CM

## 2024-06-13 DIAGNOSIS — S32.2XXS CLOSED FRACTURE OF COCCYX, SEQUELA: Primary | ICD-10-CM

## 2024-06-14 NOTE — TELEPHONE ENCOUNTER
From: Michelle Hoffman  To: Alba Grant  Sent: 6/13/2024 5:03 PM EDT  Subject: Physical Therapy Referral    Hi Dr. Grant,    I saw you on 3/21/24 and talked with you about my tailbone injury and the slow recovery. You gave me a referral for physical therapy, but I ended up not going due to not having insurance yet. I am doing a little better but would like to start therapy soon at [facility] in [place]. They're requesting an updated referral to their location: [address] [place] [address]. [address], [place] [address]. Could you please fax a new referral? The fax number is [phone] and I was told it must specify that it's for physical therapy and for how long (probably twice a week for 6 weeks).     You had also mentioned writing a prescription for a muscle relaxer, and I'd like to try that as well, especially for after the therapy visits. It could be sent to the [facility] where I work at [address] [place] [id number].     Sorry to bombard you with requests! Both the referral and prescription would be very appreciated!    Thank you and best regards,    Michelle Hoffman

## 2024-06-27 ENCOUNTER — OFFICE VISIT (OUTPATIENT)
Dept: OBGYN CLINIC | Age: 29
End: 2024-06-27
Payer: COMMERCIAL

## 2024-06-27 VITALS
HEIGHT: 66 IN | DIASTOLIC BLOOD PRESSURE: 68 MMHG | BODY MASS INDEX: 25.23 KG/M2 | WEIGHT: 157 LBS | SYSTOLIC BLOOD PRESSURE: 104 MMHG

## 2024-06-27 DIAGNOSIS — Z01.419 WELL WOMAN EXAM WITH ROUTINE GYNECOLOGICAL EXAM: Primary | ICD-10-CM

## 2024-06-27 DIAGNOSIS — Z12.4 SCREENING FOR CERVICAL CANCER: ICD-10-CM

## 2024-06-27 PROCEDURE — 99395 PREV VISIT EST AGE 18-39: CPT | Performed by: OBSTETRICS & GYNECOLOGY

## 2024-06-27 NOTE — PROGRESS NOTES
Patient presents today for   Chief Complaint   Patient presents with    Annual Exam         LMP: Patient's last menstrual period was 06/04/2024.  Contraception: none        Allergies   Allergen Reactions    Ciprofloxacin Other (See Comments)     Numbness and tingly feeling     Current Outpatient Medications   Medication Sig Dispense Refill    amphetamine-dextroamphetamine (ADDERALL, 30MG,) 30 MG tablet Take 0.5 tablets by mouth 2 times daily for 30 days. Max Daily Amount: 30 mg 30 tablet 0    amphetamine-dextroamphetamine (ADDERALL, 30MG,) 30 MG tablet Take 0.5 tablets by mouth 2 times daily for 30 days. Max Daily Amount: 30 mg 30 tablet 0     No current facility-administered medications for this visit.     History reviewed. No pertinent past medical history.  No past surgical history on file.  Social History     Socioeconomic History    Marital status: Single     Spouse name: Not on file    Number of children: Not on file    Years of education: Not on file    Highest education level: Not on file   Occupational History    Not on file   Tobacco Use    Smoking status: Former     Types: Cigarettes    Smokeless tobacco: Never    Tobacco comments:     Quit smoking: jewel   Vaping Use    Vaping Use: Every day    Substances: Nicotine, Flavoring    Devices: Pre-filled pod   Substance and Sexual Activity    Alcohol use: Yes     Alcohol/week: 4.0 standard drinks of alcohol    Drug use: No    Sexual activity: Yes     Partners: Male     Birth control/protection: Pill   Other Topics Concern    Not on file   Social History Narrative    Not on file     Social Determinants of Health     Financial Resource Strain: Low Risk  (9/22/2023)    Overall Financial Resource Strain (CARDIA)     Difficulty of Paying Living Expenses: Not very hard   Food Insecurity: Not on file (9/22/2023)   Transportation Needs: Unknown (9/22/2023)    PRAPARE - Transportation     Lack of Transportation (Medical): Not on file     Lack of Transportation

## 2024-07-03 LAB
COLLECTION METHOD: NORMAL
CYTOLOGIST CVX/VAG CYTO: NORMAL
CYTOLOGY CVX/VAG DOC THIN PREP: NORMAL
HPV REFLEX: NORMAL
Lab: NORMAL
PAP SOURCE: NORMAL
PATH REPORT.FINAL DX SPEC: NORMAL
PATHOLOGIST CVX/VAG CYTO: NORMAL
PATHOLOGIST PROVIDED ICD: NORMAL
STAT OF ADQ CVX/VAG CYTO-IMP: NORMAL

## 2024-07-15 ENCOUNTER — TELEMEDICINE (OUTPATIENT)
Dept: FAMILY MEDICINE CLINIC | Facility: CLINIC | Age: 29
End: 2024-07-15
Payer: COMMERCIAL

## 2024-07-15 DIAGNOSIS — F90.2 ATTENTION DEFICIT HYPERACTIVITY DISORDER (ADHD), COMBINED TYPE: Primary | ICD-10-CM

## 2024-07-15 PROCEDURE — 99213 OFFICE O/P EST LOW 20 MIN: CPT | Performed by: FAMILY MEDICINE

## 2024-07-15 RX ORDER — DEXTROAMPHETAMINE SACCHARATE, AMPHETAMINE ASPARTATE, DEXTROAMPHETAMINE SULFATE AND AMPHETAMINE SULFATE 7.5; 7.5; 7.5; 7.5 MG/1; MG/1; MG/1; MG/1
15 TABLET ORAL 2 TIMES DAILY
Qty: 30 TABLET | Refills: 0 | Status: CANCELLED | OUTPATIENT
Start: 2024-07-27 | End: 2024-08-26

## 2024-07-15 RX ORDER — DEXTROAMPHETAMINE SACCHARATE, AMPHETAMINE ASPARTATE, DEXTROAMPHETAMINE SULFATE AND AMPHETAMINE SULFATE 7.5; 7.5; 7.5; 7.5 MG/1; MG/1; MG/1; MG/1
15 TABLET ORAL 2 TIMES DAILY
Qty: 30 TABLET | Refills: 0 | Status: CANCELLED | OUTPATIENT
Start: 2024-08-26 | End: 2024-09-25

## 2024-07-15 RX ORDER — DEXTROAMPHETAMINE SACCHARATE, AMPHETAMINE ASPARTATE, DEXTROAMPHETAMINE SULFATE AND AMPHETAMINE SULFATE 7.5; 7.5; 7.5; 7.5 MG/1; MG/1; MG/1; MG/1
15 TABLET ORAL 2 TIMES DAILY
Qty: 30 TABLET | Refills: 0 | Status: CANCELLED | OUTPATIENT
Start: 2024-09-25 | End: 2024-10-25

## 2024-07-15 RX ORDER — DEXTROAMPHETAMINE SACCHARATE, AMPHETAMINE ASPARTATE MONOHYDRATE, DEXTROAMPHETAMINE SULFATE AND AMPHETAMINE SULFATE 7.5; 7.5; 7.5; 7.5 MG/1; MG/1; MG/1; MG/1
30 CAPSULE, EXTENDED RELEASE ORAL EVERY MORNING
Qty: 30 CAPSULE | Refills: 0 | Status: SHIPPED | OUTPATIENT
Start: 2024-07-27 | End: 2024-08-26

## 2024-07-15 RX ORDER — DEXTROAMPHETAMINE SACCHARATE, AMPHETAMINE ASPARTATE MONOHYDRATE, DEXTROAMPHETAMINE SULFATE AND AMPHETAMINE SULFATE 7.5; 7.5; 7.5; 7.5 MG/1; MG/1; MG/1; MG/1
30 CAPSULE, EXTENDED RELEASE ORAL EVERY MORNING
Qty: 30 CAPSULE | Refills: 0 | Status: SHIPPED | OUTPATIENT
Start: 2024-08-26 | End: 2024-09-25

## 2024-07-15 ASSESSMENT — PATIENT HEALTH QUESTIONNAIRE - PHQ9
2. FEELING DOWN, DEPRESSED OR HOPELESS: NOT AT ALL
SUM OF ALL RESPONSES TO PHQ QUESTIONS 1-9: 0
SUM OF ALL RESPONSES TO PHQ9 QUESTIONS 1 & 2: 0
SUM OF ALL RESPONSES TO PHQ QUESTIONS 1-9: 0
1. LITTLE INTEREST OR PLEASURE IN DOING THINGS: NOT AT ALL
SUM OF ALL RESPONSES TO PHQ QUESTIONS 1-9: 0
SUM OF ALL RESPONSES TO PHQ QUESTIONS 1-9: 0

## 2024-07-15 NOTE — PROGRESS NOTES
without any noted facial asymmetry.    EOMI.  Sclera appear normal.  Oral mucosa appears moist and intact.  No visualized neck masses.  Respiratory effort appears normal without visualized signs of difficulty breathing/respiratory distress.         Assessment and Plan:     Diagnosis Orders   1. Attention deficit hyperactivity disorder (ADHD), combined type  amphetamine-dextroamphetamine (ADDERALL XR) 30 MG extended release capsule    amphetamine-dextroamphetamine (ADDERALL XR) 30 MG extended release capsule        Change patient's treatment to Adderall XR 30 mg once daily to help with transition noted after decline of immediate release Adderall within 4 to 5 hours.  Have patient follow-up mid to late September with [facility] and then 12 weeks after that with myself.  Patient is planning on getting  in September.      Discharge Meds:  Current Outpatient Medications   Medication Sig Dispense Refill    [START ON 7/27/2024] amphetamine-dextroamphetamine (ADDERALL XR) 30 MG extended release capsule Take 1 capsule by mouth every morning for 30 days. Max Daily Amount: 30 mg 30 capsule 0    [START ON 8/26/2024] amphetamine-dextroamphetamine (ADDERALL XR) 30 MG extended release capsule Take 1 capsule by mouth every morning for 30 days. Max Daily Amount: 30 mg 30 capsule 0     No current facility-administered medications for this visit.     I have reviewed this patient's report generated by the Prescription Monitoring Program which does not demonstrate aberrancies or inconsistencies with regard to the historical prescribing of controlled medications to this patient by other providers.      F/U 9/20 with [facility] and 12 weeks with me after that.      Any new medications were explained today including any potential drug interactions or significant side effects.  The patient's questions in regards to this were answered today.    Dictated using voice recognition software.  Proofread, but unrecognized errors may

## 2024-09-16 ENCOUNTER — OFFICE VISIT (OUTPATIENT)
Dept: FAMILY MEDICINE CLINIC | Facility: CLINIC | Age: 29
End: 2024-09-16
Payer: COMMERCIAL

## 2024-09-16 VITALS
HEART RATE: 83 BPM | TEMPERATURE: 97.5 F | SYSTOLIC BLOOD PRESSURE: 118 MMHG | HEIGHT: 66 IN | WEIGHT: 154.2 LBS | DIASTOLIC BLOOD PRESSURE: 74 MMHG | BODY MASS INDEX: 24.78 KG/M2 | RESPIRATION RATE: 16 BRPM | OXYGEN SATURATION: 99 %

## 2024-09-16 DIAGNOSIS — F90.2 ATTENTION DEFICIT HYPERACTIVITY DISORDER (ADHD), COMBINED TYPE: Primary | ICD-10-CM

## 2024-09-16 DIAGNOSIS — F90.2 ATTENTION DEFICIT HYPERACTIVITY DISORDER (ADHD), COMBINED TYPE: ICD-10-CM

## 2024-09-16 PROCEDURE — 99213 OFFICE O/P EST LOW 20 MIN: CPT | Performed by: PHYSICIAN ASSISTANT

## 2024-09-16 RX ORDER — DEXTROAMPHETAMINE SACCHARATE, AMPHETAMINE ASPARTATE MONOHYDRATE, DEXTROAMPHETAMINE SULFATE AND AMPHETAMINE SULFATE 7.5; 7.5; 7.5; 7.5 MG/1; MG/1; MG/1; MG/1
30 CAPSULE, EXTENDED RELEASE ORAL EVERY MORNING
Qty: 30 CAPSULE | Refills: 0 | Status: SHIPPED | OUTPATIENT
Start: 2024-09-16 | End: 2024-10-16

## 2024-09-16 ASSESSMENT — PATIENT HEALTH QUESTIONNAIRE - PHQ9
SUM OF ALL RESPONSES TO PHQ QUESTIONS 1-9: 0
2. FEELING DOWN, DEPRESSED OR HOPELESS: NOT AT ALL
SUM OF ALL RESPONSES TO PHQ QUESTIONS 1-9: 0
SUM OF ALL RESPONSES TO PHQ9 QUESTIONS 1 & 2: 0
1. LITTLE INTEREST OR PLEASURE IN DOING THINGS: NOT AT ALL

## 2024-09-16 ASSESSMENT — ENCOUNTER SYMPTOMS: SHORTNESS OF BREATH: 0

## 2024-11-01 DIAGNOSIS — F90.2 ATTENTION DEFICIT HYPERACTIVITY DISORDER (ADHD), COMBINED TYPE: ICD-10-CM

## 2024-11-05 ENCOUNTER — TELEPHONE (OUTPATIENT)
Dept: FAMILY MEDICINE CLINIC | Facility: CLINIC | Age: 29
End: 2024-11-05

## 2024-11-05 DIAGNOSIS — F90.2 ATTENTION DEFICIT HYPERACTIVITY DISORDER (ADHD), COMBINED TYPE: ICD-10-CM

## 2024-11-05 RX ORDER — DEXTROAMPHETAMINE SACCHARATE, AMPHETAMINE ASPARTATE MONOHYDRATE, DEXTROAMPHETAMINE SULFATE AND AMPHETAMINE SULFATE 7.5; 7.5; 7.5; 7.5 MG/1; MG/1; MG/1; MG/1
30 CAPSULE, EXTENDED RELEASE ORAL EVERY MORNING
Qty: 30 CAPSULE | Refills: 0 | OUTPATIENT
Start: 2024-11-05 | End: 2024-12-05

## 2024-11-05 RX ORDER — DEXTROAMPHETAMINE SACCHARATE, AMPHETAMINE ASPARTATE MONOHYDRATE, DEXTROAMPHETAMINE SULFATE AND AMPHETAMINE SULFATE 7.5; 7.5; 7.5; 7.5 MG/1; MG/1; MG/1; MG/1
30 CAPSULE, EXTENDED RELEASE ORAL EVERY MORNING
Qty: 30 CAPSULE | Refills: 0 | Status: SHIPPED | OUTPATIENT
Start: 2024-11-05 | End: 2024-12-05

## 2024-11-05 NOTE — TELEPHONE ENCOUNTER
Pt calling needing ha refill on her adderall xr 30 mg sent to [facility] on [address]. Pt has appt on 12/16/24

## 2024-11-27 DIAGNOSIS — F90.2 ATTENTION DEFICIT HYPERACTIVITY DISORDER (ADHD), COMBINED TYPE: ICD-10-CM

## 2024-12-02 RX ORDER — DEXTROAMPHETAMINE SACCHARATE, AMPHETAMINE ASPARTATE MONOHYDRATE, DEXTROAMPHETAMINE SULFATE AND AMPHETAMINE SULFATE 7.5; 7.5; 7.5; 7.5 MG/1; MG/1; MG/1; MG/1
30 CAPSULE, EXTENDED RELEASE ORAL EVERY MORNING
Qty: 30 CAPSULE | Refills: 0 | Status: SHIPPED | OUTPATIENT
Start: 2024-12-05 | End: 2025-01-04

## 2024-12-16 ENCOUNTER — PATIENT MESSAGE (OUTPATIENT)
Dept: FAMILY MEDICINE CLINIC | Facility: CLINIC | Age: 29
End: 2024-12-16

## 2024-12-16 ENCOUNTER — OFFICE VISIT (OUTPATIENT)
Dept: FAMILY MEDICINE CLINIC | Facility: CLINIC | Age: 29
End: 2024-12-16

## 2024-12-16 VITALS
BODY MASS INDEX: 25.71 KG/M2 | RESPIRATION RATE: 16 BRPM | OXYGEN SATURATION: 98 % | WEIGHT: 160 LBS | TEMPERATURE: 97.9 F | HEIGHT: 66 IN | DIASTOLIC BLOOD PRESSURE: 82 MMHG | SYSTOLIC BLOOD PRESSURE: 122 MMHG | HEART RATE: 80 BPM

## 2024-12-16 DIAGNOSIS — Z78.9 ALCOHOL CONSUMPTION OF MORE THAN FOUR DRINKS PER DAY: ICD-10-CM

## 2024-12-16 DIAGNOSIS — S16.1XXA STRAIN OF NECK MUSCLE, INITIAL ENCOUNTER: ICD-10-CM

## 2024-12-16 DIAGNOSIS — F90.2 ATTENTION DEFICIT HYPERACTIVITY DISORDER (ADHD), COMBINED TYPE: Primary | ICD-10-CM

## 2024-12-16 DIAGNOSIS — M25.561 ACUTE PAIN OF RIGHT KNEE: ICD-10-CM

## 2024-12-16 PROCEDURE — 99214 OFFICE O/P EST MOD 30 MIN: CPT | Performed by: FAMILY MEDICINE

## 2024-12-16 RX ORDER — DEXTROAMPHETAMINE SACCHARATE, AMPHETAMINE ASPARTATE MONOHYDRATE, DEXTROAMPHETAMINE SULFATE AND AMPHETAMINE SULFATE 7.5; 7.5; 7.5; 7.5 MG/1; MG/1; MG/1; MG/1
30 CAPSULE, EXTENDED RELEASE ORAL EVERY MORNING
Qty: 30 CAPSULE | Refills: 0 | Status: SHIPPED | OUTPATIENT
Start: 2025-02-03 | End: 2025-03-05

## 2024-12-16 RX ORDER — DEXTROAMPHETAMINE SACCHARATE, AMPHETAMINE ASPARTATE MONOHYDRATE, DEXTROAMPHETAMINE SULFATE AND AMPHETAMINE SULFATE 7.5; 7.5; 7.5; 7.5 MG/1; MG/1; MG/1; MG/1
30 CAPSULE, EXTENDED RELEASE ORAL EVERY MORNING
Qty: 30 CAPSULE | Refills: 0 | Status: SHIPPED | OUTPATIENT
Start: 2025-01-04 | End: 2025-02-03

## 2024-12-16 RX ORDER — DEXTROAMPHETAMINE SACCHARATE, AMPHETAMINE ASPARTATE MONOHYDRATE, DEXTROAMPHETAMINE SULFATE AND AMPHETAMINE SULFATE 7.5; 7.5; 7.5; 7.5 MG/1; MG/1; MG/1; MG/1
30 CAPSULE, EXTENDED RELEASE ORAL EVERY MORNING
Qty: 30 CAPSULE | Refills: 0 | Status: SHIPPED | OUTPATIENT
Start: 2025-03-05 | End: 2025-04-04

## 2024-12-16 SDOH — ECONOMIC STABILITY: FOOD INSECURITY: WITHIN THE PAST 12 MONTHS, YOU WORRIED THAT YOUR FOOD WOULD RUN OUT BEFORE YOU GOT MONEY TO BUY MORE.: NEVER TRUE

## 2024-12-16 SDOH — ECONOMIC STABILITY: INCOME INSECURITY: HOW HARD IS IT FOR YOU TO PAY FOR THE VERY BASICS LIKE FOOD, HOUSING, MEDICAL CARE, AND HEATING?: NOT VERY HARD

## 2024-12-16 SDOH — ECONOMIC STABILITY: FOOD INSECURITY: WITHIN THE PAST 12 MONTHS, THE FOOD YOU BOUGHT JUST DIDN'T LAST AND YOU DIDN'T HAVE MONEY TO GET MORE.: NEVER TRUE

## 2024-12-16 ASSESSMENT — PATIENT HEALTH QUESTIONNAIRE - PHQ9
SUM OF ALL RESPONSES TO PHQ QUESTIONS 1-9: 0
1. LITTLE INTEREST OR PLEASURE IN DOING THINGS: NOT AT ALL
2. FEELING DOWN, DEPRESSED OR HOPELESS: NOT AT ALL
SUM OF ALL RESPONSES TO PHQ9 QUESTIONS 1 & 2: 0
SUM OF ALL RESPONSES TO PHQ QUESTIONS 1-9: 0

## 2024-12-16 NOTE — PROGRESS NOTES
[facility] [place] [address]  Phone: [phone] Fax [phone]  Yuval Carney M.D.  12/16/2024        Michelle Hoffman is a 29 y.o. female     HPI:  Patient is seen for Follow-up Chronic Condition (Routine f/u), Knee Pain (Right x several week), and Neck Pain  Patient comes in today for follow-up of her Adderall which helps her to concentrate and be \"productive \".  Has not noted any side effects with taking this medication.    About a month ago she recalls twisting her right knee while on their property.  Does describe having drank some alcohol that day and not recalling all of the specifics.  She reports drinking 3-4 whiskey drinks each day and usually more on the weekends.  With plans on getting pregnant later in 2025, states that she has been reducing her whiskey intake some over the last 2 weeks.    She describes using crutches for at least 2-1/2 weeks secondary to the right knee pain.  Hurts along the lower aspect of the right knee as well as the medial knee.  States she just over the last week has been able to completely straighten the right knee.  Has been wearing a knee sleeve.  Had this evaluated briefly at her mother's work, a chiropractic office.  No x-rays performed as of yet.    She also has developed some discomfort at the base of her neck posteriorly and states that she relates this to looking down at the ground often and using crutches.  No radicular pain.       ROS:  No chest pain, dyspnea, palpitations, tachycardia, insomnia, decreased appetite, or abdominal pain.    Allergies   Allergen Reactions    Ciprofloxacin Other (See Comments)     Numbness and tingly feeling       Active Ambulatory Problems     Diagnosis Date Noted    Body mass index (BMI) 27.0-27.9, adult 05/23/2023    Attention deficit hyperactivity disorder (ADHD), combined type 05/23/2023    High blood triglycerides 05/23/2023     Resolved Ambulatory Problems     Diagnosis

## 2025-03-04 DIAGNOSIS — F90.2 ATTENTION DEFICIT HYPERACTIVITY DISORDER (ADHD), COMBINED TYPE: ICD-10-CM

## 2025-03-04 RX ORDER — DEXTROAMPHETAMINE SACCHARATE, AMPHETAMINE ASPARTATE, DEXTROAMPHETAMINE SULFATE AND AMPHETAMINE SULFATE 7.5; 7.5; 7.5; 7.5 MG/1; MG/1; MG/1; MG/1
15 TABLET ORAL 2 TIMES DAILY
Qty: 30 TABLET | Refills: 0 | Status: SHIPPED | OUTPATIENT
Start: 2025-03-05 | End: 2025-04-04

## 2025-03-04 RX ORDER — DEXTROAMPHETAMINE SACCHARATE, AMPHETAMINE ASPARTATE MONOHYDRATE, DEXTROAMPHETAMINE SULFATE AND AMPHETAMINE SULFATE 7.5; 7.5; 7.5; 7.5 MG/1; MG/1; MG/1; MG/1
30 CAPSULE, EXTENDED RELEASE ORAL EVERY MORNING
Qty: 30 CAPSULE | Refills: 0 | Status: CANCELLED | OUTPATIENT
Start: 2025-03-04 | End: 2025-04-03

## 2025-03-04 NOTE — TELEPHONE ENCOUNTER
Rx pend as the adderall 30 mg 0.5 tab bid due to pt request as followed      Patient comment: I need this refilled but can I switch the prescription back to the immediate release tablets? Thank you!

## 2025-03-31 SDOH — ECONOMIC STABILITY: FOOD INSECURITY: WITHIN THE PAST 12 MONTHS, THE FOOD YOU BOUGHT JUST DIDN'T LAST AND YOU DIDN'T HAVE MONEY TO GET MORE.: NEVER TRUE

## 2025-03-31 SDOH — ECONOMIC STABILITY: INCOME INSECURITY: IN THE LAST 12 MONTHS, WAS THERE A TIME WHEN YOU WERE NOT ABLE TO PAY THE MORTGAGE OR RENT ON TIME?: NO

## 2025-03-31 SDOH — ECONOMIC STABILITY: FOOD INSECURITY: WITHIN THE PAST 12 MONTHS, YOU WORRIED THAT YOUR FOOD WOULD RUN OUT BEFORE YOU GOT MONEY TO BUY MORE.: NEVER TRUE

## 2025-03-31 SDOH — ECONOMIC STABILITY: TRANSPORTATION INSECURITY
IN THE PAST 12 MONTHS, HAS THE LACK OF TRANSPORTATION KEPT YOU FROM MEDICAL APPOINTMENTS OR FROM GETTING MEDICATIONS?: NO

## 2025-03-31 ASSESSMENT — PATIENT HEALTH QUESTIONNAIRE - PHQ9
1. LITTLE INTEREST OR PLEASURE IN DOING THINGS: NOT AT ALL
SUM OF ALL RESPONSES TO PHQ QUESTIONS 1-9: 0
SUM OF ALL RESPONSES TO PHQ QUESTIONS 1-9: 0
SUM OF ALL RESPONSES TO PHQ9 QUESTIONS 1 & 2: 0
2. FEELING DOWN, DEPRESSED OR HOPELESS: NOT AT ALL
1. LITTLE INTEREST OR PLEASURE IN DOING THINGS: NOT AT ALL
SUM OF ALL RESPONSES TO PHQ QUESTIONS 1-9: 0
SUM OF ALL RESPONSES TO PHQ QUESTIONS 1-9: 0
2. FEELING DOWN, DEPRESSED OR HOPELESS: NOT AT ALL

## 2025-04-01 ENCOUNTER — TELEPHONE (OUTPATIENT)
Dept: FAMILY MEDICINE CLINIC | Facility: CLINIC | Age: 30
End: 2025-04-01

## 2025-04-01 ENCOUNTER — OFFICE VISIT (OUTPATIENT)
Dept: FAMILY MEDICINE CLINIC | Facility: CLINIC | Age: 30
End: 2025-04-01
Payer: COMMERCIAL

## 2025-04-01 VITALS
HEIGHT: 66 IN | BODY MASS INDEX: 25.55 KG/M2 | OXYGEN SATURATION: 99 % | SYSTOLIC BLOOD PRESSURE: 120 MMHG | RESPIRATION RATE: 16 BRPM | DIASTOLIC BLOOD PRESSURE: 72 MMHG | WEIGHT: 159 LBS | TEMPERATURE: 98.1 F | HEART RATE: 99 BPM

## 2025-04-01 DIAGNOSIS — M25.561 CHRONIC PAIN OF RIGHT KNEE: ICD-10-CM

## 2025-04-01 DIAGNOSIS — F90.2 ATTENTION DEFICIT HYPERACTIVITY DISORDER (ADHD), COMBINED TYPE: ICD-10-CM

## 2025-04-01 DIAGNOSIS — G89.29 CHRONIC PAIN OF RIGHT KNEE: ICD-10-CM

## 2025-04-01 DIAGNOSIS — F90.2 ATTENTION DEFICIT HYPERACTIVITY DISORDER (ADHD), COMBINED TYPE: Primary | ICD-10-CM

## 2025-04-01 DIAGNOSIS — Z34.90 PREGNANCY, UNSPECIFIED GESTATIONAL AGE: ICD-10-CM

## 2025-04-01 PROCEDURE — 99214 OFFICE O/P EST MOD 30 MIN: CPT | Performed by: PHYSICIAN ASSISTANT

## 2025-04-01 RX ORDER — DEXTROAMPHETAMINE SACCHARATE, AMPHETAMINE ASPARTATE, DEXTROAMPHETAMINE SULFATE AND AMPHETAMINE SULFATE 7.5; 7.5; 7.5; 7.5 MG/1; MG/1; MG/1; MG/1
15 TABLET ORAL 2 TIMES DAILY
Qty: 30 TABLET | Refills: 0 | Status: CANCELLED | OUTPATIENT
Start: 2025-06-03 | End: 2025-07-03

## 2025-04-01 RX ORDER — DEXTROAMPHETAMINE SACCHARATE, AMPHETAMINE ASPARTATE, DEXTROAMPHETAMINE SULFATE AND AMPHETAMINE SULFATE 7.5; 7.5; 7.5; 7.5 MG/1; MG/1; MG/1; MG/1
15 TABLET ORAL 2 TIMES DAILY
Qty: 30 TABLET | Refills: 0 | Status: CANCELLED | OUTPATIENT
Start: 2025-05-04 | End: 2025-06-03

## 2025-04-01 RX ORDER — DEXTROAMPHETAMINE SACCHARATE, AMPHETAMINE ASPARTATE, DEXTROAMPHETAMINE SULFATE AND AMPHETAMINE SULFATE 7.5; 7.5; 7.5; 7.5 MG/1; MG/1; MG/1; MG/1
15 TABLET ORAL 2 TIMES DAILY
Qty: 30 TABLET | Refills: 0 | Status: CANCELLED | OUTPATIENT
Start: 2025-04-04 | End: 2025-05-04

## 2025-04-01 ASSESSMENT — ENCOUNTER SYMPTOMS: SHORTNESS OF BREATH: 0

## 2025-04-01 NOTE — PROGRESS NOTES
Position: Sitting)   Pulse 99   Temp 98.1 °F (36.7 °C) (Temporal)   Resp 16   Ht 1.664 m (5' 5.5\")   Wt 72.1 kg (159 lb)   LMP 02/26/2025 (Approximate)   SpO2 99%   BMI 26.06 kg/m²   Body mass index is 26.06 kg/m².     Physical Exam    Physical Exam  Vitals and nursing note reviewed.   Constitutional:       Appearance: Normal appearance. She is not ill-appearing.   HENT:      Head: Normocephalic.   Cardiovascular:      Rate and Rhythm: Normal rate and regular rhythm.      Heart sounds: Normal heart sounds. No murmur heard.  Pulmonary:      Effort: Pulmonary effort is normal.      Breath sounds: Normal breath sounds.   Musculoskeletal:      Comments: Right knee - skin intact. No erythema, edema or increased warmth. No effusion. Patella tracks well.  Patellar apprehension test negative.  Medial joint line tenderness to palpation. ROM approx 0-130 degrees without significant pain.  No crepitus noted.  Ligamentously stable.  Flexion pinch test is negative.  Lachman's negative.  No pain or laxity on varus/valgus stress. Distal n/v exam is intact.     Neurological:      Mental Status: She is alert.   Psychiatric:         Mood and Affect: Mood normal.         Behavior: Behavior normal.         Thought Content: Thought content normal.       ASSESSMENT & PLAN    ICD-10-CM    1. Attention deficit hyperactivity disorder (ADHD), combined type  F90.2       2. Chronic pain of right knee  M25.561 [facility]    G89.29       3. Pregnancy, unspecified gestational age  Z34.90            1. Attention deficit hyperactivity disorder (ADHD), combined type  *Patient has discontinued Adderall due to current pregnancy.  Recommend that she remain off of the medication.  She may want to discuss this issue with her OB/GYN to see if they have any recommendations for her.    2. Chronic pain of right knee  *Recent MRI shows no ligamentous or meniscal injury.  Symptoms have been ongoing for more than 5 months

## 2025-04-01 NOTE — PATIENT INSTRUCTIONS
*A referral has been placed to Orthopedics. They should contact you in the next week to schedule. Please let us know if you do not hear from them.   *Please call to schedule an appointment with your OB/Gyn.  *Stop Adderall.

## 2025-04-28 ENCOUNTER — PROCEDURE VISIT (OUTPATIENT)
Dept: OBGYN CLINIC | Age: 30
End: 2025-04-28
Payer: COMMERCIAL

## 2025-04-28 ENCOUNTER — OFFICE VISIT (OUTPATIENT)
Dept: OBGYN CLINIC | Age: 30
End: 2025-04-28
Payer: COMMERCIAL

## 2025-04-28 VITALS
HEIGHT: 66 IN | SYSTOLIC BLOOD PRESSURE: 102 MMHG | BODY MASS INDEX: 27.64 KG/M2 | DIASTOLIC BLOOD PRESSURE: 68 MMHG | WEIGHT: 172 LBS

## 2025-04-28 DIAGNOSIS — O26.891 RH NEGATIVE STATE IN ANTEPARTUM PERIOD, FIRST TRIMESTER: ICD-10-CM

## 2025-04-28 DIAGNOSIS — Z67.91 RH NEGATIVE STATE IN ANTEPARTUM PERIOD, FIRST TRIMESTER: ICD-10-CM

## 2025-04-28 DIAGNOSIS — N92.6 MISSED MENSES: Primary | ICD-10-CM

## 2025-04-28 DIAGNOSIS — Z34.81 ENCOUNTER FOR SUPERVISION OF OTHER NORMAL PREGNANCY IN FIRST TRIMESTER: ICD-10-CM

## 2025-04-28 DIAGNOSIS — F90.2 ADHD (ATTENTION DEFICIT HYPERACTIVITY DISORDER), COMBINED TYPE: ICD-10-CM

## 2025-04-28 DIAGNOSIS — Z11.3 SCREENING FOR STD (SEXUALLY TRANSMITTED DISEASE): ICD-10-CM

## 2025-04-28 DIAGNOSIS — Z3A.08 8 WEEKS GESTATION OF PREGNANCY: Primary | ICD-10-CM

## 2025-04-28 LAB
ABO + RH BLD: NORMAL
BASOPHILS # BLD: 0.06 K/UL (ref 0–0.2)
BASOPHILS NFR BLD: 0.6 % (ref 0–2)
BLOOD GROUP ANTIBODIES SERPL: NORMAL
DIFFERENTIAL METHOD BLD: NORMAL
EOSINOPHIL # BLD: 0.2 K/UL (ref 0–0.8)
EOSINOPHIL NFR BLD: 2 % (ref 0.5–7.8)
ERYTHROCYTE [DISTWIDTH] IN BLOOD BY AUTOMATED COUNT: 12 % (ref 11.9–14.6)
HBV SURFACE AG SER QL: NONREACTIVE
HCT VFR BLD AUTO: 36.4 % (ref 35.8–46.3)
HCV AB SER QL: NONREACTIVE
HGB BLD-MCNC: 12.6 G/DL (ref 11.7–15.4)
HIV 1+2 AB+HIV1 P24 AG SERPL QL IA: NONREACTIVE
HIV 1/2 RESULT COMMENT: NORMAL
IMM GRANULOCYTES # BLD AUTO: 0.03 K/UL (ref 0–0.5)
IMM GRANULOCYTES NFR BLD AUTO: 0.3 % (ref 0–5)
LYMPHOCYTES # BLD: 2.27 K/UL (ref 0.5–4.6)
LYMPHOCYTES NFR BLD: 22.8 % (ref 13–44)
MCH RBC QN AUTO: 30 PG (ref 26.1–32.9)
MCHC RBC AUTO-ENTMCNC: 34.6 G/DL (ref 31.4–35)
MCV RBC AUTO: 86.7 FL (ref 82–102)
MONOCYTES # BLD: 0.93 K/UL (ref 0.1–1.3)
MONOCYTES NFR BLD: 9.3 % (ref 4–12)
NEUTS SEG # BLD: 6.47 K/UL (ref 1.7–8.2)
NEUTS SEG NFR BLD: 65 % (ref 43–78)
NRBC # BLD: 0 K/UL (ref 0–0.2)
PLATELET # BLD AUTO: 349 K/UL (ref 150–450)
PMV BLD AUTO: 10.5 FL (ref 9.4–12.3)
RBC # BLD AUTO: 4.2 M/UL (ref 4.05–5.2)
RUBV IGG SERPL IA-ACNC: 119 IU/ML
T PALLIDUM AB SER QL IA: NONREACTIVE
WBC # BLD AUTO: 10 K/UL (ref 4.3–11.1)

## 2025-04-28 PROCEDURE — 76830 TRANSVAGINAL US NON-OB: CPT | Performed by: OBSTETRICS & GYNECOLOGY

## 2025-04-28 PROCEDURE — 99215 OFFICE O/P EST HI 40 MIN: CPT | Performed by: OBSTETRICS & GYNECOLOGY

## 2025-04-28 NOTE — PROGRESS NOTES
Patient presents today for   Chief Complaint   Patient presents with    Ultrasound    Amenorrhea     Worried about weight gain       LMP: Patient's last menstrual period was 02/26/2025 (approximate).  Contraception: none        Allergies   Allergen Reactions    Ciprofloxacin Other (See Comments)     Numbness and tingly feeling     Current Outpatient Medications   Medication Sig Dispense Refill    amphetamine-dextroamphetamine (ADDERALL, 10MG,) 10 MG tablet Take 1 tablet by mouth 2 times daily for 30 days. Max Daily Amount: 20 mg 60 tablet 0    [START ON 5/29/2025] amphetamine-dextroamphetamine (ADDERALL, 10MG,) 10 MG tablet Take 1 tablet by mouth 2 times daily for 30 days. Max Daily Amount: 20 mg 60 tablet 0    [START ON 6/28/2025] amphetamine-dextroamphetamine (ADDERALL, 10MG,) 10 MG tablet Take 1 tablet by mouth 2 times daily for 30 days. Max Daily Amount: 20 mg 60 tablet 0    Prenatal Vit-Fe Fumarate-FA (PRENATAL PO) Take by mouth      amphetamine-dextroamphetamine (ADDERALL, 30MG,) 30 MG tablet Take 0.5 tablets by mouth 2 times daily for 30 days. Max Daily Amount: 30 mg 30 tablet 0     No current facility-administered medications for this visit.     Past Medical History:   Diagnosis Date    ADHD (attention deficit hyperactivity disorder) 04/2022    Anemia 2020    Rh negative state in antepartum period     Trichomoniasis 2020     History reviewed. No pertinent surgical history.  Social History     Socioeconomic History    Marital status: Single     Spouse name: Not on file    Number of children: Not on file    Years of education: Not on file    Highest education level: Not on file   Occupational History    Not on file   Tobacco Use    Smoking status: Never     Passive exposure: Yes    Smokeless tobacco: Never    Tobacco comments:     Quit smoking: jewel   Vaping Use    Vaping status: Every Day    Substances: Nicotine, Flavoring    Devices: Pre-filled pod   Substance and Sexual Activity    Alcohol use: Not

## 2025-04-29 RX ORDER — DEXTROAMPHETAMINE SACCHARATE, AMPHETAMINE ASPARTATE, DEXTROAMPHETAMINE SULFATE AND AMPHETAMINE SULFATE 2.5; 2.5; 2.5; 2.5 MG/1; MG/1; MG/1; MG/1
10 TABLET ORAL 2 TIMES DAILY
Qty: 60 TABLET | Refills: 0 | Status: SHIPPED | OUTPATIENT
Start: 2025-06-28 | End: 2025-07-28

## 2025-04-29 RX ORDER — DEXTROAMPHETAMINE SACCHARATE, AMPHETAMINE ASPARTATE, DEXTROAMPHETAMINE SULFATE AND AMPHETAMINE SULFATE 2.5; 2.5; 2.5; 2.5 MG/1; MG/1; MG/1; MG/1
10 TABLET ORAL 2 TIMES DAILY
Qty: 60 TABLET | Refills: 0 | Status: SHIPPED | OUTPATIENT
Start: 2025-04-29 | End: 2025-05-29

## 2025-04-29 RX ORDER — DEXTROAMPHETAMINE SACCHARATE, AMPHETAMINE ASPARTATE, DEXTROAMPHETAMINE SULFATE AND AMPHETAMINE SULFATE 2.5; 2.5; 2.5; 2.5 MG/1; MG/1; MG/1; MG/1
10 TABLET ORAL 2 TIMES DAILY
Qty: 60 TABLET | Refills: 0 | Status: SHIPPED | OUTPATIENT
Start: 2025-05-29 | End: 2025-06-28

## 2025-04-30 LAB
BACTERIA SPEC CULT: NORMAL
SERVICE CMNT-IMP: NORMAL

## 2025-05-06 ENCOUNTER — TELEPHONE (OUTPATIENT)
Dept: OBGYN CLINIC | Age: 30
End: 2025-05-06

## 2025-05-06 DIAGNOSIS — Z3A.09 9 WEEKS GESTATION OF PREGNANCY: Primary | ICD-10-CM

## 2025-05-06 NOTE — TELEPHONE ENCOUNTER
----- Message from Aspen LUCIA sent at 5/6/2025 12:05 PM EDT -----  Regarding: OB Patient  Rosibel Oakley,    Would you be able to submit a referral to [facility] for this patient? She is pregnant and she has [employer] [facility] only. Unfortunately when she came in for her Pregnancy Confirmation no one told her that we were out of Network. She was scheduled to see you on Monday, but she asked to cancel this appointment. Thank you.    Aspen